# Patient Record
Sex: MALE | Race: BLACK OR AFRICAN AMERICAN | Employment: UNEMPLOYED | URBAN - METROPOLITAN AREA
[De-identification: names, ages, dates, MRNs, and addresses within clinical notes are randomized per-mention and may not be internally consistent; named-entity substitution may affect disease eponyms.]

---

## 2017-06-03 ENCOUNTER — HOSPITAL ENCOUNTER (EMERGENCY)
Age: 57
Discharge: HOME | End: 2017-06-03
Attending: EMERGENCY MEDICINE
Payer: MEDICARE

## 2017-06-03 VITALS
HEART RATE: 84 BPM | WEIGHT: 198 LBS | BODY MASS INDEX: 26.82 KG/M2 | RESPIRATION RATE: 18 BRPM | TEMPERATURE: 97.8 F | SYSTOLIC BLOOD PRESSURE: 143 MMHG | OXYGEN SATURATION: 97 % | HEIGHT: 72 IN | DIASTOLIC BLOOD PRESSURE: 98 MMHG

## 2017-06-03 DIAGNOSIS — R03.0 ELEVATED BLOOD PRESSURE READING: ICD-10-CM

## 2017-06-03 DIAGNOSIS — Z87.39 HX OF DEGENERATIVE DISC DISEASE: ICD-10-CM

## 2017-06-03 DIAGNOSIS — M54.6 ACUTE MIDLINE THORACIC BACK PAIN: Primary | ICD-10-CM

## 2017-06-03 PROCEDURE — A9270 NON-COVERED ITEM OR SERVICE: HCPCS | Performed by: EMERGENCY MEDICINE

## 2017-06-03 PROCEDURE — 74011636637 HC RX REV CODE- 636/637: Performed by: EMERGENCY MEDICINE

## 2017-06-03 PROCEDURE — 99283 EMERGENCY DEPT VISIT LOW MDM: CPT

## 2017-06-03 RX ORDER — PREDNISONE 20 MG/1
60 TABLET ORAL
Status: COMPLETED | OUTPATIENT
Start: 2017-06-03 | End: 2017-06-03

## 2017-06-03 RX ORDER — PREDNISONE 20 MG/1
60 TABLET ORAL DAILY
Qty: 15 TAB | Refills: 0 | Status: SHIPPED | OUTPATIENT
Start: 2017-06-03 | End: 2017-06-08

## 2017-06-03 RX ORDER — INSULIN LISPRO 100 [IU]/ML
60 INJECTION, SOLUTION INTRAVENOUS; SUBCUTANEOUS
Status: ON HOLD | COMMUNITY
End: 2017-09-27

## 2017-06-03 RX ADMIN — PREDNISONE 60 MG: 20 TABLET ORAL at 03:37

## 2017-06-03 NOTE — DISCHARGE INSTRUCTIONS
Back Pain: Care Instructions  Your Care Instructions    Back pain has many possible causes. It is often related to problems with muscles and ligaments of the back. It may also be related to problems with the nerves, discs, or bones of the back. Moving, lifting, standing, sitting, or sleeping in an awkward way can strain the back. Sometimes you don't notice the injury until later. Arthritis is another common cause of back pain. Although it may hurt a lot, back pain usually improves on its own within several weeks. Most people recover in 12 weeks or less. Using good home treatment and being careful not to stress your back can help you feel better sooner. Follow-up care is a key part of your treatment and safety. Be sure to make and go to all appointments, and call your doctor if you are having problems. Its also a good idea to know your test results and keep a list of the medicines you take. How can you care for yourself at home? · Sit or lie in positions that are most comfortable and reduce your pain. Try one of these positions when you lie down:  ¨ Lie on your back with your knees bent and supported by large pillows. ¨ Lie on the floor with your legs on the seat of a sofa or chair. Linda Rolling on your side with your knees and hips bent and a pillow between your legs. ¨ Lie on your stomach if it does not make pain worse. · Do not sit up in bed, and avoid soft couches and twisted positions. Bed rest can help relieve pain at first, but it delays healing. Avoid bed rest after the first day of back pain. · Change positions every 30 minutes. If you must sit for long periods of time, take breaks from sitting. Get up and walk around, or lie in a comfortable position. · Try using a heating pad on a low or medium setting for 15 to 20 minutes every 2 or 3 hours. Try a warm shower in place of one session with the heating pad. · You can also try an ice pack for 10 to 15 minutes every 2 to 3 hours.  Put a thin cloth between the ice pack and your skin. · Take pain medicines exactly as directed. ¨ If the doctor gave you a prescription medicine for pain, take it as prescribed. ¨ If you are not taking a prescription pain medicine, ask your doctor if you can take an over-the-counter medicine. · Take short walks several times a day. You can start with 5 to 10 minutes, 3 or 4 times a day, and work up to longer walks. Walk on level surfaces and avoid hills and stairs until your back is better. · Return to work and other activities as soon as you can. Continued rest without activity is usually not good for your back. · To prevent future back pain, do exercises to stretch and strengthen your back and stomach. Learn how to use good posture, safe lifting techniques, and proper body mechanics. When should you call for help? Call your doctor now or seek immediate medical care if:  · You have new or worsening numbness in your legs. · You have new or worsening weakness in your legs. (This could make it hard to stand up.)  · You lose control of your bladder or bowels. Watch closely for changes in your health, and be sure to contact your doctor if:  · Your pain gets worse. · You are not getting better after 2 weeks. Where can you learn more? Go to http://kailyn-law.info/. Enter R561 in the search box to learn more about \"Back Pain: Care Instructions. \"  Current as of: May 23, 2016  Content Version: 11.2  © 6508-4806 [employer]. Care instructions adapted under license by [employer] (which disclaims liability or warranty for this information). If you have questions about a medical condition or this instruction, always ask your healthcare professional. [address] any warranty or liability for your use of this information.       Musculoskeletal Pain: Care Instructions  Your Care Instructions  Different problems with the bones, muscles, nerves, ligaments, and tendons in the body can cause pain. One or more areas of your body may ache or burn. Or they may feel tired, stiff, or sore. The medical term for this type of pain is musculoskeletal pain. It can have many different causes. Sometimes the pain is caused by an injury such as a strain or sprain. Or you might have pain from using one part of your body in the same way over and over again. This is called overuse. In some cases, the cause of the pain is another health problem such as arthritis or fibromyalgia. The doctor will examine you and ask you questions about your health to help find the cause of your pain. Blood tests or imaging tests like an X-ray may also be helpful. But sometimes doctors can't find a cause of the pain. Treatment depends on your symptoms and the cause of the pain, if known. The doctor has checked you carefully, but problems can develop later. If you notice any problems or new symptoms, get medical treatment right away. Follow-up care is a key part of your treatment and safety. Be sure to make and go to all appointments, and call your doctor if you are having problems. It's also a good idea to know your test results and keep a list of the medicines you take. How can you care for yourself at home? · Rest until you feel better. · Do not do anything that makes the pain worse. Return to exercise gradually if you feel better and your doctor says it's okay. · Be safe with medicines. Read and follow all instructions on the label. ¨ If the doctor gave you a prescription medicine for pain, take it as prescribed. ¨ If you are not taking a prescription pain medicine, ask your doctor if you can take an over-the-counter medicine. · Put ice or a cold pack on the area for 10 to 20 minutes at a time to ease pain. Put a thin cloth between the ice and your skin. When should you call for help? Call your doctor now or seek immediate medical care if:  · You have new pain, or your pain gets worse.   · You have new symptoms such as a fever, a rash, or chills. Watch closely for changes in your health, and be sure to contact your doctor if:  · You do not get better as expected. Where can you learn more? Go to Unbound[employer]  Enter Q624 in the search box to learn more about \"Musculoskeletal Pain: Care Instructions. \"   © 1943-8100 [employer]. Care instructions adapted under license by [employer] (which disclaims liability or warranty for this information). This care instruction is for use with your licensed healthcare professional. If you have questions about a medical condition or this instruction, always ask your healthcare professional. [address] any warranty or liability for your use of this information.   Content Version: 93.8.584718; Current as of: November 20, 2015

## 2017-06-03 NOTE — ED PROVIDER NOTES
HPI Comments: 3:17 AM Toby Dahl is a 59 y.o. male with a hx of Gout, herniated disc, chronic lower back pain, DM, Hepatitis C, DJD, Osteoarthritis, Lupus, and other noted PMH who presents to the ED via EMS c/o mid-upper back pain X 3 days. Patient has positional exacerbations as well as trouble ambulating. He reports using a cane. Pt does mention that he has experienced this pain in the past and he was diagnosed with a herniated disc. He has tried heat compression, but that has provided minimal relief. He has not tried any pain medication or muscle relaxer's. He also c/o bilateral leg weakness secondary to the pain while ambulating. He denies any recent injuries or falls, SOB, chest pain, difficulty urinating, and BM complications. No other associated symptoms or modifying factors at this time. The history is provided by the patient. Past Medical History:   Diagnosis Date    BPH (benign prostatic hypertrophy) with urinary retention     Chronic low back pain     Diabetes (HCC)     DJD (degenerative joint disease)     ED (erectile dysfunction)     Elevated PSA     Glucose intolerance (impaired glucose tolerance)     Gout     Hepatitis C     Herniated disc     History of total knee replacement 3/2006    Hyperlipidemia     Incomplete emptying of bladder     Lupus ([facility] [address]     Osteoarthritis     Sleep apnea        Past Surgical History:   Procedure Laterality Date    HX HEART CATHETERIZATION  2006    HX KNEE ARTHROSCOPY      HX KNEE REPLACEMENT  3/2006    HX UROLOGICAL  11/06/2012    [facility],          History reviewed. No pertinent family history. Social History     Social History    Marital status:      Spouse name: N/A    Number of children: N/A    Years of education: N/A     Occupational History    Not on file.      Social History Main Topics    Smoking status: Current Every Day Smoker     Packs/day: 0.50    Smokeless tobacco: Never Used    Alcohol use No  Drug use: No    Sexual activity: Not on file     Other Topics Concern    Not on file     Social History Narrative         ALLERGIES: Hay fever and allergy relief; Penicillins; and Vioxx [rofecoxib]    Review of Systems   Constitutional: Positive for fever. HENT: Negative for trouble swallowing. Respiratory: Negative for shortness of breath. Cardiovascular: Negative for chest pain. Gastrointestinal: Negative for abdominal pain, diarrhea and vomiting. Genitourinary: Negative for difficulty urinating. Musculoskeletal: Positive for back pain and gait problem. Negative for neck pain. Skin: Negative for wound. Neurological: Positive for weakness (bilateral leg). Negative for syncope. Psychiatric/Behavioral: Negative for behavioral problems. All other systems reviewed and are negative. Vitals:    06/03/17 0101   BP: (!) 145/108   Pulse: 88   Resp: 18   Temp: 97.8 °F (36.6 °C)   SpO2: 98%   Weight: 89.8 kg (198 lb)   Height: 6' (1.829 m)            Physical Exam   Constitutional: He is oriented to person, place, and time. He appears well-developed. No distress. Chronically ill-appearing, nad   HENT:   Head: Normocephalic and atraumatic. Eyes: EOM are normal.   Neck: Normal range of motion. Cardiovascular: Normal rate. Pulmonary/Chest: Effort normal and breath sounds normal. No respiratory distress. Abdominal: Soft. There is no tenderness. Musculoskeletal: Normal range of motion. Mechanically stable, ambulates without much difficulty  No spinal tenderness  Bilateral knee surgical scars   Neurological: He is alert and oriented to person, place, and time. No focal deficits noted   Psychiatric: His behavior is normal.   Nursing note and vitals reviewed.        MDM  Number of Diagnoses or Management Options  Acute midline thoracic back pain:   Elevated blood pressure reading:   Hx of degenerative disc disease:   Diagnosis management comments: 58 yo AAM with PMHx chronic pain, DJD presents with several days mid back pain. No trauma, no fevers. No other symptoms, problems, or complaints. Examination unremarkable with no spinal tenderness and ambulates without difficulty. Pt sleeping comfortably in ED. Suspect likely related to chronic DJD. Dc home, symptom management, follow-up, return precautions. Pt states he does not tolerate nsaids, will give trial of prednisone. ED Course       Procedures    Vitals:  Patient Vitals for the past 12 hrs:   Temp Pulse Resp BP SpO2   06/03/17 0101 97.8 °F (36.6 °C) 88 18 (!) 145/108 98 %         Medications ordered:   Medications   predniSONE (DELTASONE) tablet 60 mg (not administered)         Lab findings:  No results found for this or any previous visit (from the past 12 hour(s)). Progress notes, Consult notes or additional Procedure notes:   I informed the patient about their BP and instructed them to follow up with their PCP regarding their elevated blood pressure. 3:25 AM I have reassessed the patient and discussed their results and diagnosis. Pt will be discharged in stable condition. Patient is to return to emergency department if any new or worsening condition. Patient understands and verbalizes agreement with plan. Disposition:  Diagnosis:   1. Acute midline thoracic back pain    2. Hx of degenerative disc disease    3. Elevated blood pressure reading        Disposition: Discharged    Follow-up Information     Follow up With Details Comments Contact Info    Andrez Amor MD Schedule an appointment as soon as possible for a visit Follow up [address] [place] [id number] [phone]      [facility] EMERGENCY DEPT Go to As needed, If symptoms worsen [id number]  [phone]           Patient's Medications   Start Taking    PREDNISONE (DELTASONE) 20 MG TABLET    Take 3 Tabs by mouth daily for 5 days.  With Breakfast   Continue Taking    ERGOCALCIFEROL (VITAMIN D2) 50,000 UNIT CAPSULE Take 50,000 Units by mouth. INSULIN DETEMIR (LEVEMIR) 100 UNIT/ML INJECTION    0.45 mL by SubCUTAneous route nightly. INSULIN LISPRO (HUMALOG) 100 UNIT/ML INJECTION    60 Units by SubCUTAneous route Before breakfast, lunch, and dinner. METFORMIN (GLUCOPHAGE) 500 MG TABLET    Take 1,000 mg by mouth two (2) times daily (with meals). SILDENAFIL CITRATE (VIAGRA) 100 MG TABLET    Take 1 tablet by mouth daily as needed for up to 10 doses. These Medications have changed    No medications on file   Stop Taking    No medications on file       SCRIBE ATTESTATION STATEMENT  Documented by: Clint May for, and in the presence of, Leah Castillo MD 3:22 AM    Signed by: Agustin Vazquez, 06/03/17 3:22 AM    PROVIDER ATTESTATION STATEMENT  I personally performed the services described in the documentation, reviewed the documentation, as recorded by the scribe in my presence, and it accurately and completely records my words and actions.   Leah Castillo MD

## 2017-09-03 ENCOUNTER — APPOINTMENT (OUTPATIENT)
Dept: ULTRASOUND IMAGING | Age: 57
End: 2017-09-03
Attending: EMERGENCY MEDICINE
Payer: MEDICARE

## 2017-09-03 ENCOUNTER — HOSPITAL ENCOUNTER (EMERGENCY)
Age: 57
Discharge: LEFT BEFORE DISCHARGE | End: 2017-09-03
Attending: EMERGENCY MEDICINE
Payer: MEDICARE

## 2017-09-03 VITALS
HEART RATE: 78 BPM | OXYGEN SATURATION: 100 % | SYSTOLIC BLOOD PRESSURE: 129 MMHG | RESPIRATION RATE: 14 BRPM | DIASTOLIC BLOOD PRESSURE: 77 MMHG

## 2017-09-03 DIAGNOSIS — R10.11 ABDOMINAL PAIN, RIGHT UPPER QUADRANT: Primary | ICD-10-CM

## 2017-09-03 LAB
ALBUMIN SERPL-MCNC: 3 G/DL (ref 3.4–5)
ALBUMIN/GLOB SERPL: 0.7 {RATIO} (ref 0.8–1.7)
ALP SERPL-CCNC: 179 U/L (ref 45–117)
ALT SERPL-CCNC: 19 U/L (ref 16–61)
ANION GAP SERPL CALC-SCNC: 8 MMOL/L (ref 3–18)
APPEARANCE UR: CLEAR
AST SERPL-CCNC: 13 U/L (ref 15–37)
BACTERIA URNS QL MICRO: ABNORMAL /HPF
BASOPHILS # BLD: 0 K/UL (ref 0–0.06)
BASOPHILS NFR BLD: 0 % (ref 0–2)
BILIRUB DIRECT SERPL-MCNC: 0.5 MG/DL (ref 0–0.2)
BILIRUB SERPL-MCNC: 1 MG/DL (ref 0.2–1)
BILIRUB UR QL: ABNORMAL
BUN SERPL-MCNC: 9 MG/DL (ref 7–18)
BUN/CREAT SERPL: 9 (ref 12–20)
CALCIUM SERPL-MCNC: 9.4 MG/DL (ref 8.5–10.1)
CHLORIDE SERPL-SCNC: 102 MMOL/L (ref 100–108)
CK MB CFR SERPL CALC: ABNORMAL % (ref 0–4)
CK MB SERPL-MCNC: <1 NG/ML (ref 5–25)
CK SERPL-CCNC: 32 U/L (ref 39–308)
CO2 SERPL-SCNC: 28 MMOL/L (ref 21–32)
COLOR UR: YELLOW
CREAT SERPL-MCNC: 1.01 MG/DL (ref 0.6–1.3)
DIFFERENTIAL METHOD BLD: ABNORMAL
EOSINOPHIL # BLD: 0.1 K/UL (ref 0–0.4)
EOSINOPHIL NFR BLD: 1 % (ref 0–5)
EPITH CASTS URNS QL MICRO: ABNORMAL /LPF (ref 0–5)
ERYTHROCYTE [DISTWIDTH] IN BLOOD BY AUTOMATED COUNT: 15.7 % (ref 11.6–14.5)
GLOBULIN SER CALC-MCNC: 4.1 G/DL (ref 2–4)
GLUCOSE SERPL-MCNC: 137 MG/DL (ref 74–99)
GLUCOSE UR STRIP.AUTO-MCNC: NEGATIVE MG/DL
HCT VFR BLD AUTO: 43.8 % (ref 36–48)
HGB BLD-MCNC: 14.9 G/DL (ref 13–16)
HGB UR QL STRIP: ABNORMAL
HYALINE CASTS URNS QL MICRO: ABNORMAL /LPF (ref 0–2)
KETONES UR QL STRIP.AUTO: ABNORMAL MG/DL
LEUKOCYTE ESTERASE UR QL STRIP.AUTO: ABNORMAL
LIPASE SERPL-CCNC: 140 U/L (ref 73–393)
LYMPHOCYTES # BLD: 1.2 K/UL (ref 0.9–3.6)
LYMPHOCYTES NFR BLD: 13 % (ref 21–52)
MCH RBC QN AUTO: 29.4 PG (ref 24–34)
MCHC RBC AUTO-ENTMCNC: 34 G/DL (ref 31–37)
MCV RBC AUTO: 86.6 FL (ref 74–97)
MONOCYTES # BLD: 0.9 K/UL (ref 0.05–1.2)
MONOCYTES NFR BLD: 10 % (ref 3–10)
NEUTS SEG # BLD: 7.2 K/UL (ref 1.8–8)
NEUTS SEG NFR BLD: 76 % (ref 40–73)
NITRITE UR QL STRIP.AUTO: NEGATIVE
PH UR STRIP: 6 [PH] (ref 5–8)
PLATELET # BLD AUTO: 190 K/UL (ref 135–420)
PMV BLD AUTO: 9.8 FL (ref 9.2–11.8)
POTASSIUM SERPL-SCNC: 3.9 MMOL/L (ref 3.5–5.5)
PROT SERPL-MCNC: 7.1 G/DL (ref 6.4–8.2)
PROT UR STRIP-MCNC: 30 MG/DL
RBC # BLD AUTO: 5.06 M/UL (ref 4.7–5.5)
RBC #/AREA URNS HPF: ABNORMAL /HPF (ref 0–5)
SODIUM SERPL-SCNC: 138 MMOL/L (ref 136–145)
SP GR UR REFRACTOMETRY: 1.02 (ref 1–1.03)
TROPONIN I SERPL-MCNC: <0.02 NG/ML (ref 0–0.04)
UROBILINOGEN UR QL STRIP.AUTO: 2 EU/DL (ref 0.2–1)
WBC # BLD AUTO: 9.5 K/UL (ref 4.6–13.2)
WBC URNS QL MICRO: ABNORMAL /HPF (ref 0–4)

## 2017-09-03 PROCEDURE — 96361 HYDRATE IV INFUSION ADD-ON: CPT

## 2017-09-03 PROCEDURE — 85025 COMPLETE CBC W/AUTO DIFF WBC: CPT | Performed by: EMERGENCY MEDICINE

## 2017-09-03 PROCEDURE — 96376 TX/PRO/DX INJ SAME DRUG ADON: CPT

## 2017-09-03 PROCEDURE — 83690 ASSAY OF LIPASE: CPT | Performed by: EMERGENCY MEDICINE

## 2017-09-03 PROCEDURE — 80076 HEPATIC FUNCTION PANEL: CPT | Performed by: EMERGENCY MEDICINE

## 2017-09-03 PROCEDURE — 80048 BASIC METABOLIC PNL TOTAL CA: CPT | Performed by: EMERGENCY MEDICINE

## 2017-09-03 PROCEDURE — 96374 THER/PROPH/DIAG INJ IV PUSH: CPT

## 2017-09-03 PROCEDURE — 81001 URINALYSIS AUTO W/SCOPE: CPT | Performed by: EMERGENCY MEDICINE

## 2017-09-03 PROCEDURE — 82550 ASSAY OF CK (CPK): CPT | Performed by: EMERGENCY MEDICINE

## 2017-09-03 PROCEDURE — 96375 TX/PRO/DX INJ NEW DRUG ADDON: CPT

## 2017-09-03 PROCEDURE — 74011250636 HC RX REV CODE- 250/636: Performed by: EMERGENCY MEDICINE

## 2017-09-03 PROCEDURE — 76705 ECHO EXAM OF ABDOMEN: CPT

## 2017-09-03 PROCEDURE — 99284 EMERGENCY DEPT VISIT MOD MDM: CPT

## 2017-09-03 RX ORDER — SODIUM CHLORIDE 0.9 % (FLUSH) 0.9 %
5-10 SYRINGE (ML) INJECTION EVERY 8 HOURS
Status: DISCONTINUED | OUTPATIENT
Start: 2017-09-03 | End: 2017-09-03 | Stop reason: HOSPADM

## 2017-09-03 RX ORDER — SODIUM CHLORIDE 0.9 % (FLUSH) 0.9 %
5-10 SYRINGE (ML) INJECTION AS NEEDED
Status: DISCONTINUED | OUTPATIENT
Start: 2017-09-03 | End: 2017-09-03 | Stop reason: HOSPADM

## 2017-09-03 RX ORDER — MORPHINE SULFATE 2 MG/ML
4 INJECTION, SOLUTION INTRAMUSCULAR; INTRAVENOUS ONCE
Status: COMPLETED | OUTPATIENT
Start: 2017-09-03 | End: 2017-09-03

## 2017-09-03 RX ORDER — ONDANSETRON 2 MG/ML
4 INJECTION INTRAMUSCULAR; INTRAVENOUS
Status: COMPLETED | OUTPATIENT
Start: 2017-09-03 | End: 2017-09-03

## 2017-09-03 RX ADMIN — MORPHINE SULFATE 2 MG: 2 INJECTION, SOLUTION INTRAMUSCULAR; INTRAVENOUS at 14:21

## 2017-09-03 RX ADMIN — MORPHINE SULFATE 4 MG: 2 INJECTION, SOLUTION INTRAMUSCULAR; INTRAVENOUS at 17:13

## 2017-09-03 RX ADMIN — ONDANSETRON 4 MG: 2 SOLUTION INTRAMUSCULAR; INTRAVENOUS at 16:58

## 2017-09-03 RX ADMIN — SODIUM CHLORIDE 1000 ML: 900 INJECTION, SOLUTION INTRAVENOUS at 14:16

## 2017-09-03 NOTE — ED NOTES
Dr. Shelli Verdin bedside talking with patient about risk of leaving and signing himself out AMA. Patient says he will see his cancer doctor on Monday and let him decide what he needs for abd pain.

## 2017-09-03 NOTE — ED NOTES
Call from ultrasound patient not tolerating ultrasound. Informed Dr. Eric Curry. Ordered and gave Morphine and Zofran. Patient still refusing ultrasound.

## 2017-09-03 NOTE — ED PROVIDER NOTES
HPI Comments: 2:14 PM Tobyjohnny Waller is a 59 y.o. male w/ PMHx of DM, Lupus, HLD, and HepCwho presents to the ED c/o right back and abdominal pain. Pt has had this pain for a week but claims it got worse one day ago. Pt also had a fall one day ago but does not attribute these sx to that incident. Pt has had hemoptysis, hematuria, and diahrrea for the last week but denies emesis. Pt is allergic to penicilin. Pt has no other sx or complaints. Patient is a 59 y.o. male presenting with abdominal pain and fall. Abdominal Pain    Associated symptoms include diarrhea, hematuria and back pain. Pertinent negatives include no fever, no dysuria, no arthralgias, no myalgias and no chest pain. Fall   Associated symptoms include hematuria. Pertinent negatives include no fever and no abdominal pain. Past Medical History:   Diagnosis Date    BPH (benign prostatic hypertrophy) with urinary retention     Chronic low back pain     Diabetes (HCC)     DJD (degenerative joint disease)     ED (erectile dysfunction)     Elevated PSA     Glucose intolerance (impaired glucose tolerance)     Gout     Hepatitis C     Herniated disc     History of total knee replacement 3/2006    Hyperlipidemia     Incomplete emptying of bladder     Lupus ([facility] [address]     Osteoarthritis     Sleep apnea        Past Surgical History:   Procedure Laterality Date    HX HEART CATHETERIZATION  2006    HX KNEE ARTHROSCOPY      HX KNEE REPLACEMENT  3/2006    HX UROLOGICAL  11/06/2012    [facility],          History reviewed. No pertinent family history. Social History     Social History    Marital status:      Spouse name: N/A    Number of children: N/A    Years of education: N/A     Occupational History    Not on file.      Social History Main Topics    Smoking status: Current Every Day Smoker     Packs/day: 0.50    Smokeless tobacco: Never Used    Alcohol use No    Drug use: No    Sexual activity: Not on file Other Topics Concern    Not on file     Social History Narrative         ALLERGIES: Hay fever and allergy relief; Penicillins; and Vioxx [rofecoxib]    Review of Systems   Constitutional: Negative for diaphoresis and fever. HENT: Negative for congestion and sore throat. Eyes: Negative for pain and itching. Respiratory: Positive for cough (hemoptysis). Negative for shortness of breath. Cardiovascular: Negative for chest pain and palpitations. Gastrointestinal: Positive for diarrhea. Negative for abdominal pain. Endocrine: Negative for polydipsia and polyuria. Genitourinary: Positive for hematuria. Negative for dysuria. Musculoskeletal: Positive for back pain. Negative for arthralgias and myalgias. Right abdominal pain   Skin: Negative for rash and wound. Neurological: Negative for seizures and syncope. Hematological: Does not bruise/bleed easily. Psychiatric/Behavioral: Negative for agitation and hallucinations. Vitals:    09/03/17 1540 09/03/17 1550 09/03/17 1600 09/03/17 1841   BP: (!) 156/108 (!) 126/110 121/83 129/77   Pulse:    78   Resp:    14   SpO2:   100% 100%            Physical Exam   Constitutional: He appears well-developed and well-nourished. He appears distressed. HENT:   Head: Normocephalic and atraumatic. Eyes: Conjunctivae are normal. No scleral icterus. Neck: Normal range of motion. Neck supple. No JVD present. Cardiovascular: Normal rate, regular rhythm and normal heart sounds. 4 intact extremity pulses   Pulmonary/Chest: Effort normal and breath sounds normal.   Abdominal: Soft. He exhibits no mass. There is no tenderness. Musculoskeletal: Normal range of motion. He exhibits tenderness (moderate RUQ tenderness). Lymphadenopathy:     He has no cervical adenopathy. Neurological: He is alert. Skin: Skin is warm and dry. Nursing note and vitals reviewed.        MDM  Number of Diagnoses or Management Options  Diagnosis management comments: Differential: pancreatitis, acute cholecystitis; less likely renal colic, appendicitis. ED Course       Procedures  Vitals:  No data found. Medications Ordered:  Medications   sodium chloride (NS) flush 5-10 mL (not administered)   sodium chloride (NS) flush 5-10 mL (not administered)   morphine injection 4 mg (2 mg IntraVENous Given 9/3/17 1421)   sodium chloride 0.9 % bolus infusion 1,000 mL (0 mL IntraVENous IV Completed 9/3/17 1500)   morphine injection 4 mg (4 mg IntraVENous Given 9/3/17 1713)   ondansetron (ZOFRAN) injection 4 mg (4 mg IntraVENous Given 9/3/17 1658)       Lab Findings:  Recent Results (from the past 12 hour(s))   CBC WITH AUTOMATED DIFF    Collection Time: 09/03/17  1:20 PM   Result Value Ref Range    WBC 9.5 4.6 - 13.2 K/uL    RBC 5.06 4.70 - 5.50 M/uL    HGB 14.9 13.0 - 16.0 g/dL    HCT 43.8 36.0 - 48.0 %    MCV 86.6 74.0 - 97.0 FL    MCH 29.4 24.0 - 34.0 PG    MCHC 34.0 31.0 - 37.0 g/dL    RDW 15.7 (H) 11.6 - 14.5 %    PLATELET 658 812 - 712 K/uL    MPV 9.8 9.2 - 11.8 FL    NEUTROPHILS 76 (H) 40 - 73 %    LYMPHOCYTES 13 (L) 21 - 52 %    MONOCYTES 10 3 - 10 %    EOSINOPHILS 1 0 - 5 %    BASOPHILS 0 0 - 2 %    ABS. NEUTROPHILS 7.2 1.8 - 8.0 K/UL    ABS. LYMPHOCYTES 1.2 0.9 - 3.6 K/UL    ABS. MONOCYTES 0.9 0.05 - 1.2 K/UL    ABS. EOSINOPHILS 0.1 0.0 - 0.4 K/UL    ABS.  BASOPHILS 0.0 0.0 - 0.06 K/UL    DF AUTOMATED     METABOLIC PANEL, BASIC    Collection Time: 09/03/17  1:20 PM   Result Value Ref Range    Sodium 138 136 - 145 mmol/L    Potassium 3.9 3.5 - 5.5 mmol/L    Chloride 102 100 - 108 mmol/L    CO2 28 21 - 32 mmol/L    Anion gap 8 3.0 - 18 mmol/L    Glucose 137 (H) 74 - 99 mg/dL    BUN 9 7.0 - 18 MG/DL    Creatinine 1.01 0.6 - 1.3 MG/DL    BUN/Creatinine ratio 9 (L) 12 - 20      GFR est AA >60 >60 ml/min/1.73m2    GFR est non-AA >60 >60 ml/min/1.73m2    Calcium 9.4 8.5 - 10.1 MG/DL   CARDIAC PANEL,(CK, CKMB & TROPONIN)    Collection Time: 09/03/17  1:20 PM   Result Value Ref Range    CK 32 (L) 39 - 308 U/L    CK - MB <1.0 <3.6 ng/ml    CK-MB Index  0.0 - 4.0 %     CALCULATION NOT PERFORMED WHEN RESULT IS BELOW LINEAR LIMIT    Troponin-I, Qt. <0.02 0.0 - 0.045 NG/ML   HEPATIC FUNCTION PANEL    Collection Time: 09/03/17  1:20 PM   Result Value Ref Range    Protein, total 7.1 6.4 - 8.2 g/dL    Albumin 3.0 (L) 3.4 - 5.0 g/dL    Globulin 4.1 (H) 2.0 - 4.0 g/dL    A-G Ratio 0.7 (L) 0.8 - 1.7      Bilirubin, total 1.0 0.2 - 1.0 MG/DL    Bilirubin, direct 0.5 (H) 0.0 - 0.2 MG/DL    Alk. phosphatase 179 (H) 45 - 117 U/L    AST (SGOT) 13 (L) 15 - 37 U/L    ALT (SGPT) 19 16 - 61 U/L   LIPASE    Collection Time: 09/03/17  1:20 PM   Result Value Ref Range    Lipase 140 73 - 393 U/L   URINALYSIS W/ RFLX MICROSCOPIC    Collection Time: 09/03/17  2:20 PM   Result Value Ref Range    Color YELLOW      Appearance CLEAR      Specific gravity 1.020 1.005 - 1.030      pH (UA) 6.0 5.0 - 8.0      Protein 30 (A) NEG mg/dL    Glucose NEGATIVE  NEG mg/dL    Ketone TRACE (A) NEG mg/dL    Bilirubin SMALL (A) NEG      Blood TRACE (A) NEG      Urobilinogen 2.0 (H) 0.2 - 1.0 EU/dL    Nitrites NEGATIVE  NEG      Leukocyte Esterase SMALL (A) NEG     URINE MICROSCOPIC ONLY    Collection Time: 09/03/17  2:20 PM   Result Value Ref Range    WBC 4 to 10 0 - 4 /hpf    RBC 1 to 3 0 - 5 /hpf    Epithelial cells FEW 0 - 5 /lpf    Bacteria FEW (A) NEG /hpf    Hyaline cast 0 to 2 0 - 2 /lpf         X-ray, CT or radiology findings or impressions:  [facility]    (Results Pending)       Progress notes, consult notes, or additional procedure notes:  During the ultrasound, patient stated his pain was too severe and didn't want to continue, he came back to the ER where he received pain and nausea meds, he explain he has appointment Tuesday with onc where he will get mri of his liver, and he just want sto wait for this. He appears coherent and able to understands risks of leaving with the imaging I've recommended.   I also discussed changing to CT abd which would not place pressure on his abd. He declines. Risks include undiagnosed surgical emergency in his abd, infected galbladder, other undiagnosed disease, possibly leading to death. Diagnosis:   1. Abdominal pain, right upper quadrant        Disposition: AMA    Follow-up Information     None           Patient's Medications   Start Taking    No medications on file   Continue Taking    ERGOCALCIFEROL (VITAMIN D2) 50,000 UNIT CAPSULE    Take 50,000 Units by mouth. INSULIN DETEMIR (LEVEMIR) 100 UNIT/ML INJECTION    0.45 mL by SubCUTAneous route nightly. INSULIN LISPRO (HUMALOG) 100 UNIT/ML INJECTION    60 Units by SubCUTAneous route Before breakfast, lunch, and dinner. METFORMIN (GLUCOPHAGE) 500 MG TABLET    Take 1,000 mg by mouth two (2) times daily (with meals). These Medications have changed    No medications on file   Stop Taking    SILDENAFIL CITRATE (VIAGRA) 100 MG TABLET    Take 1 tablet by mouth daily as needed for up to 10 doses. [address] acting as a scribe for and in the presence of Sarah Branham MD      September 03, 2017 at 2:14 PM       Provider Attestation:      I personally performed the services described in the documentation, reviewed the documentation, as recorded by the scribe in my presence, and it accurately and completely records my words and actions.  September 03, 2017 at 2:14 PM - Sarah Branham MD

## 2017-09-03 NOTE — ED NOTES
Discharged patient via wheelchair AMA after explaining risk of leaving without treatment per Dr. Asia Gu.

## 2017-09-26 ENCOUNTER — APPOINTMENT (OUTPATIENT)
Dept: CT IMAGING | Age: 57
DRG: 436 | End: 2017-09-26
Attending: EMERGENCY MEDICINE
Payer: MEDICARE

## 2017-09-26 ENCOUNTER — HOSPITAL ENCOUNTER (INPATIENT)
Age: 57
LOS: 2 days | Discharge: HOME | DRG: 436 | End: 2017-09-28
Attending: EMERGENCY MEDICINE | Admitting: INTERNAL MEDICINE
Payer: MEDICARE

## 2017-09-26 ENCOUNTER — HOSPITAL ENCOUNTER (EMERGENCY)
Age: 57
Discharge: HOME | DRG: 436 | End: 2017-09-26
Attending: EMERGENCY MEDICINE
Payer: MEDICARE

## 2017-09-26 VITALS
TEMPERATURE: 99.5 F | OXYGEN SATURATION: 99 % | SYSTOLIC BLOOD PRESSURE: 106 MMHG | DIASTOLIC BLOOD PRESSURE: 81 MMHG | HEART RATE: 120 BPM | RESPIRATION RATE: 27 BRPM

## 2017-09-26 DIAGNOSIS — C78.7 METASTATIC CANCER TO LIVER (HCC): Primary | ICD-10-CM

## 2017-09-26 DIAGNOSIS — C22.0 HEPATIC CARCINOMA (HCC): Primary | ICD-10-CM

## 2017-09-26 PROBLEM — C22.8 LIVER CANCER, PRIMARY, WITH METASTASIS FROM LIVER TO OTHER SITE (HCC): Status: ACTIVE | Noted: 2017-09-26

## 2017-09-26 LAB
ALBUMIN SERPL-MCNC: 2.6 G/DL (ref 3.4–5)
ALBUMIN/GLOB SERPL: 0.5 {RATIO} (ref 0.8–1.7)
ALP SERPL-CCNC: 422 U/L (ref 45–117)
ALT SERPL-CCNC: 49 U/L (ref 16–61)
ANION GAP SERPL CALC-SCNC: 10 MMOL/L (ref 3–18)
AST SERPL-CCNC: 56 U/L (ref 15–37)
ATRIAL RATE: 95 BPM
BASOPHILS # BLD: 0 K/UL (ref 0–0.06)
BASOPHILS NFR BLD: 0 % (ref 0–2)
BILIRUB DIRECT SERPL-MCNC: 0.8 MG/DL (ref 0–0.2)
BILIRUB SERPL-MCNC: 1.3 MG/DL (ref 0.2–1)
BUN SERPL-MCNC: 7 MG/DL (ref 7–18)
BUN/CREAT SERPL: 8 (ref 12–20)
CALCIUM SERPL-MCNC: 9.4 MG/DL (ref 8.5–10.1)
CALCULATED P AXIS, ECG09: 45 DEGREES
CALCULATED R AXIS, ECG10: -41 DEGREES
CALCULATED T AXIS, ECG11: 58 DEGREES
CHLORIDE SERPL-SCNC: 99 MMOL/L (ref 100–108)
CO2 SERPL-SCNC: 27 MMOL/L (ref 21–32)
CREAT SERPL-MCNC: 0.88 MG/DL (ref 0.6–1.3)
DIAGNOSIS, 93000: NORMAL
DIFFERENTIAL METHOD BLD: ABNORMAL
EOSINOPHIL # BLD: 0.2 K/UL (ref 0–0.4)
EOSINOPHIL NFR BLD: 2 % (ref 0–5)
ERYTHROCYTE [DISTWIDTH] IN BLOOD BY AUTOMATED COUNT: 15.5 % (ref 11.6–14.5)
EST. AVERAGE GLUCOSE BLD GHB EST-MCNC: 146 MG/DL
GLOBULIN SER CALC-MCNC: 4.8 G/DL (ref 2–4)
GLUCOSE BLD STRIP.AUTO-MCNC: 60 MG/DL (ref 70–110)
GLUCOSE BLD STRIP.AUTO-MCNC: 87 MG/DL (ref 70–110)
GLUCOSE BLD STRIP.AUTO-MCNC: 91 MG/DL (ref 70–110)
GLUCOSE BLD STRIP.AUTO-MCNC: 93 MG/DL (ref 70–110)
GLUCOSE BLD STRIP.AUTO-MCNC: 93 MG/DL (ref 70–110)
GLUCOSE SERPL-MCNC: 55 MG/DL (ref 74–99)
HBA1C MFR BLD: 6.7 % (ref 4.2–5.6)
HCT VFR BLD AUTO: 46.7 % (ref 36–48)
HGB BLD-MCNC: 16.1 G/DL (ref 13–16)
LIPASE SERPL-CCNC: 75 U/L (ref 73–393)
LYMPHOCYTES # BLD: 0.8 K/UL (ref 0.9–3.6)
LYMPHOCYTES NFR BLD: 6 % (ref 21–52)
MCH RBC QN AUTO: 29.2 PG (ref 24–34)
MCHC RBC AUTO-ENTMCNC: 34.5 G/DL (ref 31–37)
MCV RBC AUTO: 84.6 FL (ref 74–97)
MONOCYTES # BLD: 1.5 K/UL (ref 0.05–1.2)
MONOCYTES NFR BLD: 11 % (ref 3–10)
NEUTS SEG # BLD: 11.4 K/UL (ref 1.8–8)
NEUTS SEG NFR BLD: 81 % (ref 40–73)
P-R INTERVAL, ECG05: 132 MS
PLATELET # BLD AUTO: 182 K/UL (ref 135–420)
PMV BLD AUTO: 10 FL (ref 9.2–11.8)
POTASSIUM SERPL-SCNC: 3.7 MMOL/L (ref 3.5–5.5)
PROT SERPL-MCNC: 7.4 G/DL (ref 6.4–8.2)
Q-T INTERVAL, ECG07: 358 MS
QRS DURATION, ECG06: 88 MS
QTC CALCULATION (BEZET), ECG08: 449 MS
RBC # BLD AUTO: 5.52 M/UL (ref 4.7–5.5)
SODIUM SERPL-SCNC: 136 MMOL/L (ref 136–145)
TROPONIN I SERPL-MCNC: <0.02 NG/ML (ref 0–0.04)
VENTRICULAR RATE, ECG03: 95 BPM
WBC # BLD AUTO: 13.9 K/UL (ref 4.6–13.2)

## 2017-09-26 PROCEDURE — 74011250636 HC RX REV CODE- 250/636: Performed by: INTERNAL MEDICINE

## 2017-09-26 PROCEDURE — 96374 THER/PROPH/DIAG INJ IV PUSH: CPT

## 2017-09-26 PROCEDURE — 99284 EMERGENCY DEPT VISIT MOD MDM: CPT

## 2017-09-26 PROCEDURE — 83036 HEMOGLOBIN GLYCOSYLATED A1C: CPT | Performed by: INTERNAL MEDICINE

## 2017-09-26 PROCEDURE — 74011250636 HC RX REV CODE- 250/636: Performed by: EMERGENCY MEDICINE

## 2017-09-26 PROCEDURE — 65660000000 HC RM CCU STEPDOWN

## 2017-09-26 RX ORDER — HEPARIN SODIUM 5000 [USP'U]/ML
5000 INJECTION, SOLUTION INTRAVENOUS; SUBCUTANEOUS EVERY 8 HOURS
Status: DISCONTINUED | OUTPATIENT
Start: 2017-09-26 | End: 2017-09-28 | Stop reason: HOSPADM

## 2017-09-26 RX ORDER — DEXTROSE 50 % IN WATER (D50W) INTRAVENOUS SYRINGE
Status: COMPLETED
Start: 2017-09-26 | End: 2017-09-26

## 2017-09-26 RX ORDER — INSULIN LISPRO 100 [IU]/ML
INJECTION, SOLUTION INTRAVENOUS; SUBCUTANEOUS
Status: DISCONTINUED | OUTPATIENT
Start: 2017-09-26 | End: 2017-09-28 | Stop reason: HOSPADM

## 2017-09-26 RX ORDER — DEXTROSE 50 % IN WATER (D50W) INTRAVENOUS SYRINGE
25
Status: COMPLETED | OUTPATIENT
Start: 2017-09-26 | End: 2017-09-26

## 2017-09-26 RX ORDER — MORPHINE SULFATE 10 MG/ML
5 INJECTION, SOLUTION INTRAMUSCULAR; INTRAVENOUS
Status: DISCONTINUED | OUTPATIENT
Start: 2017-09-26 | End: 2017-09-28 | Stop reason: HOSPADM

## 2017-09-26 RX ORDER — MORPHINE SULFATE 2 MG/ML
4 INJECTION, SOLUTION INTRAMUSCULAR; INTRAVENOUS ONCE
Status: COMPLETED | OUTPATIENT
Start: 2017-09-26 | End: 2017-09-26

## 2017-09-26 RX ORDER — ZOLPIDEM TARTRATE 5 MG/1
5 TABLET ORAL
Status: DISCONTINUED | OUTPATIENT
Start: 2017-09-26 | End: 2017-09-28 | Stop reason: HOSPADM

## 2017-09-26 RX ORDER — DEXTROSE 50 % IN WATER (D50W) INTRAVENOUS SYRINGE
25-50 AS NEEDED
Status: DISCONTINUED | OUTPATIENT
Start: 2017-09-26 | End: 2017-09-28 | Stop reason: HOSPADM

## 2017-09-26 RX ORDER — SODIUM CHLORIDE 9 MG/ML
100 INJECTION, SOLUTION INTRAVENOUS CONTINUOUS
Status: DISCONTINUED | OUTPATIENT
Start: 2017-09-26 | End: 2017-09-27

## 2017-09-26 RX ORDER — ONDANSETRON 4 MG/1
4 TABLET, ORALLY DISINTEGRATING ORAL
Status: DISCONTINUED | OUTPATIENT
Start: 2017-09-26 | End: 2017-09-28 | Stop reason: HOSPADM

## 2017-09-26 RX ORDER — MAGNESIUM SULFATE 100 %
4 CRYSTALS MISCELLANEOUS AS NEEDED
Status: DISCONTINUED | OUTPATIENT
Start: 2017-09-26 | End: 2017-09-28 | Stop reason: HOSPADM

## 2017-09-26 RX ORDER — LORAZEPAM 2 MG/ML
0.5 INJECTION INTRAMUSCULAR
Status: COMPLETED | OUTPATIENT
Start: 2017-09-26 | End: 2017-09-26

## 2017-09-26 RX ADMIN — DEXTROSE 50 % IN WATER (D50W) INTRAVENOUS SYRINGE 12.5 G: at 13:24

## 2017-09-26 RX ADMIN — DEXTROSE MONOHYDRATE 12.5 G: 25 INJECTION, SOLUTION INTRAVENOUS at 13:24

## 2017-09-26 RX ADMIN — MORPHINE SULFATE 4 MG: 2 INJECTION, SOLUTION INTRAMUSCULAR; INTRAVENOUS at 16:56

## 2017-09-26 RX ADMIN — HEPARIN SODIUM 5000 UNITS: 5000 INJECTION, SOLUTION INTRAVENOUS; SUBCUTANEOUS at 20:56

## 2017-09-26 RX ADMIN — LORAZEPAM 0.5 MG: 2 INJECTION INTRAMUSCULAR; INTRAVENOUS at 12:22

## 2017-09-26 RX ADMIN — SODIUM CHLORIDE 1000 ML: 900 INJECTION, SOLUTION INTRAVENOUS at 12:21

## 2017-09-26 RX ADMIN — DEXTROSE MONOHYDRATE 25 G: 25 INJECTION, SOLUTION INTRAVENOUS at 11:01

## 2017-09-26 RX ADMIN — MORPHINE SULFATE 4 MG: 2 INJECTION, SOLUTION INTRAMUSCULAR; INTRAVENOUS at 13:41

## 2017-09-26 RX ADMIN — IOPAMIDOL 95 ML: 612 INJECTION, SOLUTION INTRAVENOUS at 11:46

## 2017-09-26 RX ADMIN — SODIUM CHLORIDE 100 ML/HR: 900 INJECTION, SOLUTION INTRAVENOUS at 20:56

## 2017-09-26 NOTE — IP AVS SNAPSHOT
[address] [place] [address] 
[phone] Patient: Sharda Wise MRN: [id number] You are allergic to the following Allergen Reactions Hay Fever And Allergy Relief Other (comments) Other/intolerance Penicillins Anaphylaxis Angioedema Vioxx (Rofecoxib) Other (comments) Other/intolerance Recent Documentation Height Weight BMI Smoking Status 1.829 m 68.2 kg 20.38 kg/m2 Current Every Day Smoker Emergency Contacts Name Discharge Info Relation Home Work Mobile [address] [facility] CAREGIVER [3] Spouse [3] [phone] About your hospitalization You were admitted on:  September 26, 2017 You last received care in the:  [address] You were discharged on:  September 28, 2017 Unit phone number:  [phone] Why you were hospitalized Your primary diagnosis was:  Liver Cancer, Primary, With Metastasis From Liver To Other Site (Hcc) Your diagnoses also included:  Hepatitis C, Gout, Type Ii Diabetes Mellitus With Complication (Hcc), Hypoglycemia Due To Insulin Providers Seen During Your Hospitalizations Provider Role Specialty Primary office phone Pat Reed DO Attending Provider Emergency Medicine [phone] Maximiliano Lemus MD Attending Provider Emergency Medicine [phone] Lanie Victoria DO Attending Provider Internal Medicine [phone] Your Primary Care Physician (PCP) Primary Care Physician Office Phone Office Fax Lissette Kelleythers [phone] Follow-up Information Follow up With Details Comments Contact Info Izabella Glover MD  Oct 6 1:40pm appointment [address] [place] [phone] 
[phone] Jaja Castañeda MD On 10/4/2017 830am [address] [place] [address] 
[phone] Current Discharge Medication List  
  
START taking these medications Dose & Instructions Dispensing Information Comments Morning Noon Evening Bedtime  
 ondansetron 8 mg disintegrating tablet Commonly known as:  ZOFRAN ODT Your last dose was: Your next dose is:    
   
   
 Dose:  8 mg Take 1 Tab by mouth every eight (8) hours as needed for Nausea. Quantity:  30 Tab Refills:  1  
     
   
   
   
  
 oxyCODONE IR 5 mg immediate release tablet Commonly known as:  [place] Your last dose was: Your next dose is:    
   
   
 Dose:  5-10 mg Take 1-2 Tabs by mouth every six (6) hours as needed for Pain. Max Daily Amount: 40 mg.  
 Quantity:  45 Tab Refills:  0 CONTINUE these medications which have CHANGED Dose & Instructions Dispensing Information Comments Morning Noon Evening Bedtime * insulin detemir 100 unit/mL injection Commonly known as:  LEVEMIR What changed:  how much to take Your last dose was: Your next dose is:    
   
   
 Dose:  20 Units 20 Units by SubCUTAneous route nightly. Quantity:  1 Vial  
Refills:  1  
     
   
   
   
  
 * insulin detemir 100 unit/mL (3 mL) Inpn Commonly known as:  Yeni Pride What changed:  how much to take Your last dose was: Your next dose is:    
   
   
 Dose:  4 Units 4 Units by SubCUTAneous route nightly. Quantity:  1 Adjustable Dose Pre-filled Pen Syringe Refills:  1  
     
   
   
   
  
 insulin lispro 100 unit/mL injection Commonly known as:  HumaLOG What changed:   
- how much to take - Another medication with the same name was removed. Continue taking this medication, and follow the directions you see here. Your last dose was: Your next dose is:    
   
   
 Dose:  10 Units 10 Units by SubCUTAneous route Before breakfast, lunch, and dinner. Quantity:  1 Vial  
Refills:  3 * Notice: This list has 2 medication(s) that are the same as other medications prescribed for you. Read the directions carefully, and ask your doctor or other care provider to review them with you. CONTINUE these medications which have NOT CHANGED Dose & Instructions Dispensing Information Comments Morning Noon Evening Bedtime VITAMIN D2 50,000 unit capsule Generic drug:  ergocalciferol Your last dose was: Your next dose is:    
   
   
 Dose:  [address]791 Units Take 50,000 Units by mouth. Refills:  0 STOP taking these medications   
 metFORMIN 500 mg tablet Commonly known as:  GLUCOPHAGE Where to Get Your Medications These medications were sent to [address], [address]  [address] [id number] Phone:  [phone] insulin detemir 100 unit/mL injection Information on where to get these meds will be given to you by the nurse or doctor. ! Ask your nurse or doctor about these medications  
  insulin detemir 100 unit/mL (3 mL) Inpn  
 insulin lispro 100 unit/mL injection  
 ondansetron 8 mg disintegrating tablet  
 oxyCODONE IR 5 mg immediate release tablet Discharge Instructions DISCHARGE SUMMARY from Nurse The following personal items are in your possession at time of discharge: 
 
Dental Appliances: None Visual Aid: None, Glasses, With patient Home Medications: None Jewelry: None Clothing: Socks, Undergarments, Pants, Shirt, Footwear Other Valuables: Cell Phone, Eyeglasses, [address] [place], [id number], Personal electronic devices (comment) PATIENT INSTRUCTIONS: 
 
 
F-face looks uneven A-arms unable to move or move unevenly S-speech slurred or non-existent T-time-call 911 as soon as signs and symptoms begin-DO NOT go Back to bed or wait to see if you get better-TIME IS BRAIN. Warning Signs of HEART ATTACK Call 911 if you have these symptoms: 
? Chest discomfort. Most heart attacks involve discomfort in the center of the chest that lasts more than a few minutes, or that goes away and comes back. It can feel like uncomfortable pressure, squeezing, fullness, or pain. ? Discomfort in other areas of the upper body. Symptoms can include pain or discomfort in one or both arms, the back, neck, jaw, or stomach. ? Shortness of breath with or without chest discomfort. ? Other signs may include breaking out in a cold sweat, nausea, or lightheadedness. Don't wait more than five minutes to call [address]! Fast action can save your life. Calling 911 is almost always the fastest way to get lifesaving treatment. Emergency Medical Services staff can begin treatment when they arrive  up to an hour sooner than if someone gets to the hospital by car. The discharge information has been reviewed with the patient. The patient verbalized understanding. Discharge medications reviewed with the patient and appropriate educational materials and side effects teaching were provided. Wallit Activation Thank you for enrolling in [facility]. Please follow the instructions below to securely access your online medical record. Wallit allows you to send messages to your doctor, view your test results, renew your prescriptions, schedule appointments, and more. How Do I Sign Up? 1. In your internet browser, go to [email or web address]. 2. Click on the First Time User? Click Here link in the Sign In box. You will see the New Member Sign Up page. 3. Enter your Xtalic Access Code exactly as it appears below. You will not need to use this code after youve completed the sign-up process. If you do not sign up before the expiration date, you must request a new code. Xtalic Access Code: [email or web address] Expires: 12/25/2017  2:21 PM  
 
4. Enter the last four digits of your Social Security Number (xxxx) and Date of Birth (mm/dd/yyyy) as indicated and click Submit. You will be taken to the next sign-up page. 5. Create a Xtalic ID. This will be your Xtalic login ID and cannot be changed, so think of one that is secure and easy to remember. 6. Create a Xtalic password. You can change your password at any time. 7. Enter your Password Reset Question and Answer. This can be used at a later time if you forget your password. 8. Enter your e-mail address. You will receive e-mail notification when new information is available in [address] 9. Click Sign Up. You can now view your medical record. Additional Information Remember, Xtalic is NOT to be used for urgent needs. For medical emergencies, dial 911. Now available from your iPhone and Android! Discharge Instructions Attachments/References LIVER BIOPSY: PERCUTANEOUS: POST-OP (ENGLISH) Discharge Orders None Xtalic Announcement We are excited to announce that we are making your provider's discharge notes available to you in Xtalic. You will see these notes when they are completed and signed by the physician that discharged you from your recent hospital stay. If you have any questions or concerns about any information you see in Xtalic, please call the Health Information Department where you were seen or reach out to your Primary Care Provider for more information about your plan of care. Introducing [address]!    
 Shila Singh introduces Xtalic patient portal. Now you can access parts of your medical record, email your doctor's office, and request medication refills online. 1. In your internet browser, go to [email or web address] 2. Click on the First Time User? Click Here link in the Sign In box. You will see the New Member Sign Up page. 3. Enter your Run3D Access Code exactly as it appears below. You will not need to use this code after youve completed the sign-up process. If you do not sign up before the expiration date, you must request a new code. · Run3D Access Code: [email or web address] Expires: 12/25/2017  2:21 PM 
 
4. Enter the last four digits of your Social Security Number (xxxx) and Date of Birth (mm/dd/yyyy) as indicated and click Submit. You will be taken to the next sign-up page. 5. Create a Run3D ID. This will be your Run3D login ID and cannot be changed, so think of one that is secure and easy to remember. 6. Create a Run3D password. You can change your password at any time. 7. Enter your Password Reset Question and Answer. This can be used at a later time if you forget your password. 8. Enter your e-mail address. You will receive e-mail notification when new information is available in [address] 9. Click Sign Up. You can now view and download portions of your medical record. 10. Click the Download Summary menu link to download a portable copy of your medical information. If you have questions, please visit the Frequently Asked Questions section of the Run3D website. Remember, Run3D is NOT to be used for urgent needs. For medical emergencies, dial 911. Now available from your iPhone and Android! General Information Please provide this summary of care documentation to your next provider. Patient Signature:  ____________________________________________________________ Date:  ____________________________________________________________  
  
Iesha Feller Provider Signature:  ____________________________________________________________ Date:  ____________________________________________________________ More Information Percutaneous Liver Biopsy: What to Expect at Home Your Recovery Percutaneous liver biopsy is a procedure to take a tiny sample (biopsy) of your liver tissue. Percutaneous (say \"per-anusha-ESTEBAN-nee-us) means \"through the skin. \" The procedure is also called aspiration biopsy or fine-needle aspiration. The tissue sample is looked at under a microscope. Your doctor can look for infection or other liver problems. You may have some pain where the biopsy needle entered your skin (the puncture site). You may also have pain in your shoulder. This is called referred pain. It is caused by pain traveling along a nerve near the biopsy site. The referred pain usually lasts less than 12 hours. You may have a small amount of bleeding from the puncture site. You will need to take it easy at home for 1 or 2 days after the procedure. You will probably be able to return to work and most of your usual activities after that. This care sheet gives you a general idea about how long it will take for you to recover. But each person recovers at a different pace. Follow the steps below to get better as quickly as possible. How can you care for yourself at home? Activity · Rest when you feel tired. Getting enough sleep will help you recover. · Try to walk each day. Start by walking a little more than you did the day before. Bit by bit, increase the amount you walk. Walking boosts blood flow and helps prevent pneumonia and constipation. · Avoid exercises that use your belly muscles and strenuous activities, such as bicycle riding, jogging, weight lifting, or aerobic exercise, for 1 week or until your doctor says it is okay. · Ask your doctor when you can drive again. · You will probably need to take 1 or 2 days off from work. It depends on the type of work you do and how you feel. · You will probably be able to shower the same day as the test, if your doctor says it is okay. Pat the puncture site dry. Do not take a bath for at least 2 days after the test, or until your doctor tells you it is okay. Diet · You can eat your normal diet. If your stomach is upset, try bland, low-fat foods like plain rice, broiled chicken, toast, and yogurt. · Drink plenty of fluids (unless your doctor tells you not to). Medicines · Your doctor will tell you if and when you can restart your medicines. He or she will also give you instructions about taking any new medicines. · If you take blood thinners, such as warfarin (Coumadin), clopidogrel (Plavix), or aspirin, be sure to talk to your doctor. He or she will tell you if and when to start taking those medicines again. Make sure that you understand exactly what your doctor wants you to do. · Be safe with medicines. Take pain medicines exactly as directed. ¨ If the doctor gave you a prescription medicine for pain, take it as prescribed. ¨ If you are not taking a prescription pain medicine, take an over-the-counter medicine that your doctor recommends. Read and follow all instructions on the label. ¨ Do not take aspirin, ibuprofen (Advil, Motrin), naproxen (Aleve), or other nonsteroidal anti-inflammatory drugs (NSAIDs) unless your doctor says it is okay. · If you think your pain medicine is making you sick to your stomach: 
¨ Take your medicine after meals (unless your doctor has told you not to). ¨ Ask your doctor for a different kind of pain medicine. Care of the puncture site · Keep a bandage over the puncture site for the first 1 or 2 days. Follow-up care is a key part of your treatment and safety. Be sure to make and go to all appointments, and call your doctor if you are having problems. It's also a good idea to know your test results and keep a list of the medicines you take. When should you call for help? Call 911 anytime you think you may need emergency care. For example, call if: 
· You passed out (lost consciousness). · You have severe trouble breathing. · You have sudden chest pain and shortness of breath, or you cough up blood. · You have severe pain in your chest, shoulder, or belly. Call your doctor now or seek immediate medical care if: 
· You have new or worse shortness of breath. · Bright red blood has soaked through the bandage over the puncture site. · You have pain that does not get better after you take your pain medicine. · You are sick to your stomach or cannot keep fluids down. · You have a fever, chills, or body aches. · You have signs of infection, such as: 
¨ Increased pain, swelling, warmth, or redness. ¨ Red streaks leading from the puncture site. ¨ Pus draining from the puncture site. ¨ A fever. · You have new or worse pain at the puncture site. · You have new or worse belly swelling or bloating. · You have trouble passing urine or stool. · Your stools are black and tarlike or have streaks of blood. · You have pale-colored stools along with dark urine and itching. Watch closely for changes in your health, and be sure to contact your doctor if you have any problems. Where can you learn more? Go to http://kailyn-law.info/. Enter Q443 in the search box to learn more about \"Percutaneous Liver Biopsy: What to Expect at Home. \" Current as of: October 14, 2016 Content Version: 11.3 © 1949-[address]. Care instructions adapted under license by [employer] (which disclaims liability or warranty for this information). If you have questions about a medical condition or this instruction, always ask your healthcare professional. [address] any warranty or liability for your use of this information.

## 2017-09-26 NOTE — ED TRIAGE NOTES
Pt with 1 week of chest and abdominal pain that started 1 week ago with loss of appetite. Associated symptoms are diaphoresis and cold. Pt takes insulin daily and continuing to not ea.   BS 44 by EMS, no treatmens

## 2017-09-26 NOTE — ED PROVIDER NOTES
HPI Comments: Lennox Nancy is a 59 y.o. Male who presents to the ED for continued abdominal pain and chills. Returns to ED after getting his \"affairs in order. \" No other symptoms or concerns were expressed. The history is provided by the patient. Past Medical History:   Diagnosis Date    BPH (benign prostatic hypertrophy) with urinary retention     Chronic low back pain     Diabetes (HCC)     DJD (degenerative joint disease)     ED (erectile dysfunction)     Elevated PSA     Glucose intolerance (impaired glucose tolerance)     Gout     Hepatitis C     Herniated disc     History of total knee replacement 3/2006    Hyperlipidemia     Incomplete emptying of bladder     Lupus ([facility] [address]     Osteoarthritis     Sleep apnea        Past Surgical History:   Procedure Laterality Date    HX HEART CATHETERIZATION  2006    HX KNEE ARTHROSCOPY      HX KNEE REPLACEMENT  3/2006    HX UROLOGICAL  11/06/2012    [facility], [facility],          History reviewed. No pertinent family history. Social History     Social History    Marital status:      Spouse name: N/A    Number of children: N/A    Years of education: N/A     Occupational History    Not on file. Social History Main Topics    Smoking status: Current Every Day Smoker     Packs/day: 0.50    Smokeless tobacco: Never Used    Alcohol use No    Drug use: No    Sexual activity: Not on file     Other Topics Concern    Not on file     Social History Narrative         ALLERGIES: Hay fever and allergy relief; Penicillins; and Vioxx [rofecoxib]    Review of Systems   Constitutional: Positive for chills. Negative for diaphoresis and fever. HENT: Negative for congestion and sore throat. Eyes: Negative for pain and itching. Respiratory: Negative for cough and shortness of breath. Cardiovascular: Negative for chest pain and palpitations. Gastrointestinal: Positive for abdominal pain. Negative for diarrhea.    Endocrine: Negative for polydipsia and polyuria. Genitourinary: Negative for dysuria and hematuria. Musculoskeletal: Negative for arthralgias and myalgias. Skin: Negative for rash and wound. Neurological: Negative for seizures and syncope. Hematological: Does not bruise/bleed easily. Psychiatric/Behavioral: Negative for agitation and hallucinations. Vitals:    09/26/17 1556 09/26/17 1635 09/26/17 1638 09/26/17 1641   BP: 97/70 114/80     Pulse: (!) 55  (!) 127 (!) 151   Resp: 17  16 29   Temp: 98 °F (36.7 °C)      SpO2: 100%  96% 100%   Weight: 68.5 kg (151 lb)               Physical Exam   Constitutional: He appears well-developed and well-nourished. HENT:   Head: Normocephalic and atraumatic. Eyes: Conjunctivae are normal. No scleral icterus. Neck: Normal range of motion. Neck supple. No JVD present. Cardiovascular: Regular rhythm and normal heart sounds. Tachycardia present. 4 intact extremity pulses   Pulmonary/Chest: Effort normal and breath sounds normal.   Abdominal: Soft. He exhibits no mass. There is tenderness in the epigastric area. Musculoskeletal: Normal range of motion. Lymphadenopathy:     He has no cervical adenopathy. Neurological: He is alert. Skin: Skin is warm and dry. Nursing note and vitals reviewed.        MDM  Number of Diagnoses or Management Options  Diagnosis management comments: Patient returns for admission for progression of carcinoma    ED Course       Procedures    Vitals:  Patient Vitals for the past 12 hrs:   Temp Pulse Resp BP SpO2   09/26/17 1641 - (!) 151 29 - 100 %   09/26/17 1638 - (!) 127 16 - 96 %   09/26/17 1635 - - - 114/80 -   09/26/17 1556 98 °F (36.7 °C) (!) 55 17 97/70 100 %       Medications ordered:   Medications   morphine injection 4 mg (4 mg IntraVENous Given 9/26/17 1656)         Lab findings:  Recent Results (from the past 12 hour(s))   EKG, 12 LEAD, INITIAL    Collection Time: 09/26/17 11:01 AM   Result Value Ref Range    Ventricular Rate 95 BPM Atrial Rate 95 BPM    P-R Interval 132 ms    QRS Duration 88 ms    Q-T Interval 358 ms    QTC Calculation (Bezet) 449 ms    Calculated P Axis 45 degrees    Calculated R Axis -41 degrees    Calculated T Axis 58 degrees    Diagnosis       Sinus rhythm with occasional premature ventricular complexes  Left axis deviation  Nonspecific T wave abnormality  Abnormal ECG  When compared with ECG of 16-MAY-2015 23:19,  premature ventricular complexes are now present  ST now depressed in Anterior leads  Nonspecific T wave abnormality now evident in Anterolateral leads     CBC WITH AUTOMATED DIFF    Collection Time: 09/26/17 11:10 AM   Result Value Ref Range    WBC 13.9 (H) 4.6 - 13.2 K/uL    RBC 5.52 (H) 4.70 - 5.50 M/uL    HGB 16.1 (H) 13.0 - 16.0 g/dL    HCT 46.7 36.0 - 48.0 %    MCV 84.6 74.0 - 97.0 FL    MCH 29.2 24.0 - 34.0 PG    MCHC 34.5 31.0 - 37.0 g/dL    RDW 15.5 (H) 11.6 - 14.5 %    PLATELET 847 639 - 327 K/uL    MPV 10.0 9.2 - 11.8 FL    NEUTROPHILS 81 (H) 40 - 73 %    LYMPHOCYTES 6 (L) 21 - 52 %    MONOCYTES 11 (H) 3 - 10 %    EOSINOPHILS 2 0 - 5 %    BASOPHILS 0 0 - 2 %    ABS. NEUTROPHILS 11.4 (H) 1.8 - 8.0 K/UL    ABS. LYMPHOCYTES 0.8 (L) 0.9 - 3.6 K/UL    ABS. MONOCYTES 1.5 (H) 0.05 - 1.2 K/UL    ABS. EOSINOPHILS 0.2 0.0 - 0.4 K/UL    ABS.  BASOPHILS 0.0 0.0 - 0.06 K/UL    DF AUTOMATED     METABOLIC PANEL, BASIC    Collection Time: 09/26/17 11:10 AM   Result Value Ref Range    Sodium 136 136 - 145 mmol/L    Potassium 3.7 3.5 - 5.5 mmol/L    Chloride 99 (L) 100 - 108 mmol/L    CO2 27 21 - 32 mmol/L    Anion gap 10 3.0 - 18 mmol/L    Glucose 55 (L) 74 - 99 mg/dL    BUN 7 7.0 - 18 MG/DL    Creatinine 0.88 0.6 - 1.3 MG/DL    BUN/Creatinine ratio 8 (L) 12 - 20      GFR est AA >60 >60 ml/min/1.73m2    GFR est non-AA >60 >60 ml/min/1.73m2    Calcium 9.4 8.5 - 10.1 MG/DL   TROPONIN I    Collection Time: 09/26/17 11:10 AM   Result Value Ref Range    Troponin-I, Qt. <0.02 0.0 - 0.045 NG/ML   LIPASE    Collection Time: 09/26/17 11:10 AM   Result Value Ref Range    Lipase 75 73 - 393 U/L   HEPATIC FUNCTION PANEL    Collection Time: 09/26/17 11:10 AM   Result Value Ref Range    Protein, total 7.4 6.4 - 8.2 g/dL    Albumin 2.6 (L) 3.4 - 5.0 g/dL    Globulin 4.8 (H) 2.0 - 4.0 g/dL    A-G Ratio 0.5 (L) 0.8 - 1.7      Bilirubin, total 1.3 (H) 0.2 - 1.0 MG/DL    Bilirubin, direct 0.8 (H) 0.0 - 0.2 MG/DL    Alk. phosphatase 422 (H) 45 - 117 U/L    AST (SGOT) 56 (H) 15 - 37 U/L    ALT (SGPT) 49 16 - 61 U/L   GLUCOSE, POC    Collection Time: 09/26/17 11:40 AM   Result Value Ref Range    Glucose (POC) 93 70 - 110 mg/dL   GLUCOSE, POC    Collection Time: 09/26/17 12:23 PM   Result Value Ref Range    Glucose (POC) 87 70 - 110 mg/dL   GLUCOSE, POC    Collection Time: 09/26/17  1:21 PM   Result Value Ref Range    Glucose (POC) 60 (L) 70 - 110 mg/dL   GLUCOSE, POC    Collection Time: 09/26/17  2:37 PM   Result Value Ref Range    Glucose (POC) 91 70 - 110 mg/dL       Progress notes, Consult notes or additional Procedure notes:   Consult:  Discussed care with Dr. Whitt, hospitalist. Standard discussion; including history of patients chief complaint, available diagnostic results, and treatment course. Will see patient and admit.  4:52 PM, 9/26/2017       Reevaluation of patient:     Disposition:  Diagnosis:   1. Hepatic carcinoma ([facility] [address]        Disposition: Admit. Follow-up Information     None           Patient's Medications   Start Taking    No medications on file   Continue Taking    ERGOCALCIFEROL (VITAMIN D2) 50,000 UNIT CAPSULE    Take 50,000 Units by mouth. INSULIN DETEMIR (LEVEMIR) 100 UNIT/ML INJECTION    0.45 mL by SubCUTAneous route nightly. INSULIN LISPRO (HUMALOG) 100 UNIT/ML INJECTION    60 Units by SubCUTAneous route Before breakfast, lunch, and dinner. METFORMIN (GLUCOPHAGE) 500 MG TABLET    Take 1,000 mg by mouth two (2) times daily (with meals).    These Medications have changed    No medications on file   Stop Taking No medications on file         Scribe Attestation      Stephain acting as a scribe for and in the presence of Shakira Storm MD      September 26, 2017 at 4:52 PM       Provider Attestation:      I personally performed the services described in the documentation, reviewed the documentation, as recorded by the scribe in my presence, and it accurately and completely records my words and actions.  September 26, 2017 at 4:52 PM - Shakira Storm MD

## 2017-09-26 NOTE — H&P
History and Physical    Patient: Rosa Elena Coleman               Sex: male          DOA: 9/26/2017       YOB: 1960      Age:  59 y.o. Assessment/Plan     Hypoglycemia - hold levemir till tomorrow. Keep eating/drink juice. SSI AC/HS  Liver CA with metastasis - primary source liver? Need Biopsy IR consult placed. VOA consulted. NPO @ midnight  HCV - s/p treatment  IDDM - see above. Hx of gout - not on treatment. Was on colchicine. BPH - not on treatment  Malnutrition - mild    Code status: Full  PPX:  DVT: heparin   GI: None indicated    HPI:     Chief Complaint   Patient presents with    Abdominal Pain       Toby Shaikh is a 59 y.o. male with PMHX of IDDM, HCV, Gout and recent liver CA who presents with 1 week of sweating, weakness, and back pain. States he has had anorexia since a procedure in July, possible a liver biopsy. He denies any other N/V/D or bowel symptoms. He follows with Dr Destiny Stone for liver cancer and states he had chemotherapy in July, no records in epic. He has HCV but this was treated prior to chemotherapy. CT abd reveals increased peritoneal fluid, multiple liver masses, and retroperitoneal and pelvic adenopathy. Dr Alexandra Khalil contacted. Despite code discussion patient still wants to be full code. Past Medical History:   Diagnosis Date    BPH (benign prostatic hypertrophy) with urinary retention     Chronic low back pain     Diabetes (HCC)     DJD (degenerative joint disease)     ED (erectile dysfunction)     Elevated PSA     Glucose intolerance (impaired glucose tolerance)     Gout     Hepatitis C     Herniated disc     History of total knee replacement 3/2006    Hyperlipidemia     Incomplete emptying of bladder     Lupus (HCC)     Osteoarthritis     Sleep apnea        Prior to Admission Medications   Prescriptions Last Dose Informant Patient Reported?  Taking?   ergocalciferol (VITAMIN D2) 50,000 unit capsule   Yes No   Sig: Take 50,000 Units by mouth. insulin detemir (LEVEMIR) 100 unit/mL injection   No No   Si.45 mL by SubCUTAneous route nightly. Patient taking differently: 60 Units by SubCUTAneous route nightly. insulin lispro (HUMALOG) 100 unit/mL injection   Yes No   Si Units by SubCUTAneous route Before breakfast, lunch, and dinner. metFORMIN (GLUCOPHAGE) 500 mg tablet   Yes No   Sig: Take 1,000 mg by mouth two (2) times daily (with meals). Facility-Administered Medications: None       Social History:  Social History     Social History    Marital status:      Spouse name: N/A    Number of children: N/A    Years of education: N/A     Occupational History    Not on file. Social History Main Topics    Smoking status: Current Every Day Smoker     Packs/day: 0.50    Smokeless tobacco: Never Used    Alcohol use No    Drug use: No    Sexual activity: Not on file     Other Topics Concern    Not on file     Social History Narrative       Family History:  History reviewed. No pertinent family history. Surgical History:  Past Surgical History:   Procedure Laterality Date    HX HEART CATHETERIZATION      HX KNEE ARTHROSCOPY      HX KNEE REPLACEMENT  3/2006    HX UROLOGICAL  2012    [facility], [facility],        Review of Systems  Constitutional:  + fever + weight loss  HEENT:  No headache or visual changes  Cardiovascular:  No chest pain or diaphoresis  Respiratory:  No coughing, wheezing, or shortness of breath. GI:  No nausea or vomitting.   No diarrhea  :  No hematuria or dysuria  Skin:  No rashes or moles  Neuro:  No seizures or syncope  Hematological:  No bruising or bleeding  Endocrine:  No diabetes or thyroid disease    Physical Exam:      Visit Vitals    /80    Pulse (!) 151    Temp 98 °F (36.7 °C)    Resp 29    Wt 68.5 kg (151 lb)    SpO2 100%    BMI 20.48 kg/m2       Physical Exam:  Gen:  No distress, alert  HEENT:  Normal cephalic atraumatic, extra-occular movements are intact. Neck:  Supple, No JVD  Lungs:  Clear bilaterally, no wheeze, no rales, normal effort  Heart:  Regular Rate and Rhythm, normal S1 and S2, no edema  Abdomen:  Soft, non tender,slight distension, normal bowel sounds, no guarding. Extremities:  Well perfused, no cyanosis or edema  Neurological:  Awake and alert, CN's are intact, normal strength throughout extremities  Skin:  No rashes or moles  Psych:  Normal thought process, does not appear anxious    Laboratory Studies: All lab results for the last 24 hours reviewed. Recent Results (from the past 12 hour(s))   EKG, 12 LEAD, INITIAL    Collection Time: 09/26/17 11:01 AM   Result Value Ref Range    Ventricular Rate 95 BPM    Atrial Rate 95 BPM    P-R Interval 132 ms    QRS Duration 88 ms    Q-T Interval 358 ms    QTC Calculation (Bezet) 449 ms    Calculated P Axis 45 degrees    Calculated R Axis -41 degrees    Calculated T Axis 58 degrees    Diagnosis       Sinus rhythm with occasional premature ventricular complexes  Left axis deviation  Nonspecific T wave abnormality  Abnormal ECG  When compared with ECG of 16-MAY-2015 23:19,  premature ventricular complexes are now present  ST now depressed in Anterior leads  Nonspecific T wave abnormality now evident in Anterolateral leads     CBC WITH AUTOMATED DIFF    Collection Time: 09/26/17 11:10 AM   Result Value Ref Range    WBC 13.9 (H) 4.6 - 13.2 K/uL    RBC 5.52 (H) 4.70 - 5.50 M/uL    HGB 16.1 (H) 13.0 - 16.0 g/dL    HCT 46.7 36.0 - 48.0 %    MCV 84.6 74.0 - 97.0 FL    MCH 29.2 24.0 - 34.0 PG    MCHC 34.5 31.0 - 37.0 g/dL    RDW 15.5 (H) 11.6 - 14.5 %    PLATELET 908 297 - 123 K/uL    MPV 10.0 9.2 - 11.8 FL    NEUTROPHILS 81 (H) 40 - 73 %    LYMPHOCYTES 6 (L) 21 - 52 %    MONOCYTES 11 (H) 3 - 10 %    EOSINOPHILS 2 0 - 5 %    BASOPHILS 0 0 - 2 %    ABS. NEUTROPHILS 11.4 (H) 1.8 - 8.0 K/UL    ABS. LYMPHOCYTES 0.8 (L) 0.9 - 3.6 K/UL    ABS. MONOCYTES 1.5 (H) 0.05 - 1.2 K/UL    ABS.  EOSINOPHILS 0.2 0.0 - 0.4 K/UL ABS. BASOPHILS 0.0 0.0 - 0.06 K/UL    DF AUTOMATED     METABOLIC PANEL, BASIC    Collection Time: 09/26/17 11:10 AM   Result Value Ref Range    Sodium 136 136 - 145 mmol/L    Potassium 3.7 3.5 - 5.5 mmol/L    Chloride 99 (L) 100 - 108 mmol/L    CO2 27 21 - 32 mmol/L    Anion gap 10 3.0 - 18 mmol/L    Glucose 55 (L) 74 - 99 mg/dL    BUN 7 7.0 - 18 MG/DL    Creatinine 0.88 0.6 - 1.3 MG/DL    BUN/Creatinine ratio 8 (L) 12 - 20      GFR est AA >60 >60 ml/min/1.73m2    GFR est non-AA >60 >60 ml/min/1.73m2    Calcium 9.4 8.5 - 10.1 MG/DL   TROPONIN I    Collection Time: 09/26/17 11:10 AM   Result Value Ref Range    Troponin-I, Qt. <0.02 0.0 - 0.045 NG/ML   LIPASE    Collection Time: 09/26/17 11:10 AM   Result Value Ref Range    Lipase 75 73 - 393 U/L   HEPATIC FUNCTION PANEL    Collection Time: 09/26/17 11:10 AM   Result Value Ref Range    Protein, total 7.4 6.4 - 8.2 g/dL    Albumin 2.6 (L) 3.4 - 5.0 g/dL    Globulin 4.8 (H) 2.0 - 4.0 g/dL    A-G Ratio 0.5 (L) 0.8 - 1.7      Bilirubin, total 1.3 (H) 0.2 - 1.0 MG/DL    Bilirubin, direct 0.8 (H) 0.0 - 0.2 MG/DL    Alk.  phosphatase 422 (H) 45 - 117 U/L    AST (SGOT) 56 (H) 15 - 37 U/L    ALT (SGPT) 49 16 - 61 U/L   GLUCOSE, POC    Collection Time: 09/26/17 11:40 AM   Result Value Ref Range    Glucose (POC) 93 70 - 110 mg/dL   GLUCOSE, POC    Collection Time: 09/26/17 12:23 PM   Result Value Ref Range    Glucose (POC) 87 70 - 110 mg/dL   GLUCOSE, POC    Collection Time: 09/26/17  1:21 PM   Result Value Ref Range    Glucose (POC) 60 (L) 70 - 110 mg/dL   GLUCOSE, POC    Collection Time: 09/26/17  2:37 PM   Result Value Ref Range    Glucose (POC) 91 70 - 110 mg/dL       Rad:  CT abd 9/26

## 2017-09-26 NOTE — ED NOTES
Patient was in ED earlier today and discharged to get affairs in order to be admitted. Pt returned to ED with belongings. Patient given a warm blanket and IV started.

## 2017-09-26 NOTE — ED NOTES
Discharge instructions reviewed with patient. BS checked at discharge and pt provided food. Pt to return to ER after he gets his \"affairs in order. \" pt ambulated to waiting room with a steady gait with cane for ride home.

## 2017-09-26 NOTE — ED PROVIDER NOTES
HPI Comments: Rodri James is a 59 y.o. Male with PMHx of DM who presents to the ED with c/o CP, hypoglycemia and abdominal pain for the past week. Associated symptoms include decreased appetite, diaphoresis and chills. Patient admits he has been compliant with insulin. BG was 44 per EMS. Denies diarrhea. No other symptoms or concerns were expressed. The history is provided by the patient and the EMS personnel. Past Medical History:   Diagnosis Date    BPH (benign prostatic hypertrophy) with urinary retention     Chronic low back pain     Diabetes (HCC)     DJD (degenerative joint disease)     ED (erectile dysfunction)     Elevated PSA     Glucose intolerance (impaired glucose tolerance)     Gout     Hepatitis C     Herniated disc     History of total knee replacement 3/2006    Hyperlipidemia     Incomplete emptying of bladder     Lupus ([facility] [address]     Osteoarthritis     Sleep apnea        Past Surgical History:   Procedure Laterality Date    HX HEART CATHETERIZATION  2006    HX KNEE ARTHROSCOPY      HX KNEE REPLACEMENT  3/2006    HX UROLOGICAL  11/06/2012    [facility],          History reviewed. No pertinent family history. Social History     Social History    Marital status:      Spouse name: N/A    Number of children: N/A    Years of education: N/A     Occupational History    Not on file. Social History Main Topics    Smoking status: Current Every Day Smoker     Packs/day: 0.50    Smokeless tobacco: Never Used    Alcohol use No    Drug use: No    Sexual activity: Not on file     Other Topics Concern    Not on file     Social History Narrative         ALLERGIES: Hay fever and allergy relief; Penicillins; and Vioxx [rofecoxib]    Review of Systems   Constitutional: Positive for appetite change (decreased), chills and diaphoresis. Negative for fever. Hypoglycemia   HENT: Negative for congestion and sore throat. Eyes: Negative for pain and itching. Respiratory: Negative for cough and shortness of breath. Cardiovascular: Positive for chest pain. Negative for palpitations. Gastrointestinal: Positive for abdominal pain. Negative for diarrhea. Endocrine: Negative for polydipsia and polyuria. Genitourinary: Negative for dysuria and hematuria. Musculoskeletal: Negative for arthralgias and myalgias. Skin: Negative for rash and wound. Neurological: Negative for seizures and syncope. Hematological: Does not bruise/bleed easily. Psychiatric/Behavioral: Negative for agitation and hallucinations. Vitals:    09/26/17 1059 09/26/17 1215   BP: 104/78 122/86   Pulse: 92 (!) 111   Resp: 24 28   Temp: 99.5 °F (37.5 °C)    SpO2: 100% (!) 85%            Physical Exam   Constitutional: He appears well-developed and well-nourished. Cool   HENT:   Head: Normocephalic and atraumatic. Eyes: Conjunctivae are normal. No scleral icterus. Neck: Normal range of motion. Neck supple. No JVD present. Cardiovascular: Normal rate, regular rhythm and normal heart sounds. 4 intact extremity pulses   Pulmonary/Chest: Effort normal and breath sounds normal.   Abdominal: Soft. He exhibits no mass. There is generalized tenderness. Questionable epigastric mass vs. Rectus abdominus muscle   Musculoskeletal: Normal range of motion. Lymphadenopathy:     He has no cervical adenopathy. Neurological: He is alert. Skin: Skin is warm and dry. Nursing note and vitals reviewed. MDM  Number of Diagnoses or Management Options  Diagnosis management comments: Will hydrate. Patient given glucose. Evaluate for intraabdominal pathology.     ED Course       Procedures    Vitals:  Patient Vitals for the past 12 hrs:   Temp Pulse Resp BP SpO2   09/26/17 1215 - (!) 111 28 122/86 (!) 85 %   09/26/17 1059 99.5 °F (37.5 °C) 92 24 104/78 100 %       Medications ordered:   Medications   sodium chloride 0.9 % bolus infusion 1,000 mL (1,000 mL IntraVENous New Bag 9/26/17 1221) dextrose (D50W) injection syrg 25 g (25 g IntraVENous Given 9/26/17 1101)   iopamidol (ISOVUE 300) 61 % contrast injection 100-200 mL (95 mL IntraVENous Given 9/26/17 1146)   LORazepam (ATIVAN) injection 0.5 mg (0.5 mg IntraVENous Given 9/26/17 1222)         Lab findings:  Recent Results (from the past 12 hour(s))   EKG, 12 LEAD, INITIAL    Collection Time: 09/26/17 11:01 AM   Result Value Ref Range    Ventricular Rate 95 BPM    Atrial Rate 95 BPM    P-R Interval 132 ms    QRS Duration 88 ms    Q-T Interval 358 ms    QTC Calculation (Bezet) 449 ms    Calculated P Axis 45 degrees    Calculated R Axis -41 degrees    Calculated T Axis 58 degrees    Diagnosis       Sinus rhythm with occasional premature ventricular complexes  Left axis deviation  Nonspecific T wave abnormality  Abnormal ECG  When compared with ECG of 16-MAY-2015 23:19,  premature ventricular complexes are now present  ST now depressed in Anterior leads  Nonspecific T wave abnormality now evident in Anterolateral leads     CBC WITH AUTOMATED DIFF    Collection Time: 09/26/17 11:10 AM   Result Value Ref Range    WBC 13.9 (H) 4.6 - 13.2 K/uL    RBC 5.52 (H) 4.70 - 5.50 M/uL    HGB 16.1 (H) 13.0 - 16.0 g/dL    HCT 46.7 36.0 - 48.0 %    MCV 84.6 74.0 - 97.0 FL    MCH 29.2 24.0 - 34.0 PG    MCHC 34.5 31.0 - 37.0 g/dL    RDW 15.5 (H) 11.6 - 14.5 %    PLATELET 365 880 - 744 K/uL    MPV 10.0 9.2 - 11.8 FL    NEUTROPHILS 81 (H) 40 - 73 %    LYMPHOCYTES 6 (L) 21 - 52 %    MONOCYTES 11 (H) 3 - 10 %    EOSINOPHILS 2 0 - 5 %    BASOPHILS 0 0 - 2 %    ABS. NEUTROPHILS 11.4 (H) 1.8 - 8.0 K/UL    ABS. LYMPHOCYTES 0.8 (L) 0.9 - 3.6 K/UL    ABS. MONOCYTES 1.5 (H) 0.05 - 1.2 K/UL    ABS. EOSINOPHILS 0.2 0.0 - 0.4 K/UL    ABS.  BASOPHILS 0.0 0.0 - 0.06 K/UL    DF AUTOMATED     METABOLIC PANEL, BASIC    Collection Time: 09/26/17 11:10 AM   Result Value Ref Range    Sodium 136 136 - 145 mmol/L    Potassium 3.7 3.5 - 5.5 mmol/L    Chloride 99 (L) 100 - 108 mmol/L    CO2 27 21 - 32 mmol/L    Anion gap 10 3.0 - 18 mmol/L    Glucose 55 (L) 74 - 99 mg/dL    BUN 7 7.0 - 18 MG/DL    Creatinine 0.88 0.6 - 1.3 MG/DL    BUN/Creatinine ratio 8 (L) 12 - 20      GFR est AA >60 >60 ml/min/1.73m2    GFR est non-AA >60 >60 ml/min/1.73m2    Calcium 9.4 8.5 - 10.1 MG/DL   TROPONIN I    Collection Time: 09/26/17 11:10 AM   Result Value Ref Range    Troponin-I, Qt. <0.02 0.0 - 0.045 NG/ML   LIPASE    Collection Time: 09/26/17 11:10 AM   Result Value Ref Range    Lipase 75 73 - 393 U/L   HEPATIC FUNCTION PANEL    Collection Time: 09/26/17 11:10 AM   Result Value Ref Range    Protein, total 7.4 6.4 - 8.2 g/dL    Albumin 2.6 (L) 3.4 - 5.0 g/dL    Globulin 4.8 (H) 2.0 - 4.0 g/dL    A-G Ratio 0.5 (L) 0.8 - 1.7      Bilirubin, total 1.3 (H) 0.2 - 1.0 MG/DL    Bilirubin, direct 0.8 (H) 0.0 - 0.2 MG/DL    Alk. phosphatase 422 (H) 45 - 117 U/L    AST (SGOT) 56 (H) 15 - 37 U/L    ALT (SGPT) 49 16 - 61 U/L   GLUCOSE, POC    Collection Time: 09/26/17 11:40 AM   Result Value Ref Range    Glucose (POC) 93 70 - 110 mg/dL   GLUCOSE, POC    Collection Time: 09/26/17 12:23 PM   Result Value Ref Range    Glucose (POC) 87 70 - 110 mg/dL       EKG interpretation by ED Physician:  11:01 NSR with PVCs @ 95 bpm. No acute process. X-Ray, CT or other radiology findings or impressions:  CT ABD PELV W CONT   Final Result      CT A/P:  1. Gross, necrotic periportal and retroperitoneal adenopathy encasing the  visceral branch vessels and anterior aorta. Multiple liver masses. Peroneal  carcinomatosis. Left pelvic adenopathy.      2. Subcentimeter left lower lobe subpleural nodule, nonspecific and for which  metastatic disease is not excluded.      3. Nonobstructive nephrolithiasis    Progress notes, Consult notes or additional Procedure notes:   Consult:  Discussed care with Dr. Vira Johnson, PCP. Standard discussion; including history of patients chief complaint, available diagnostic results, and treatment course.  Patient's oncologist is Dr. Elizabeth Cotton.  1:06 PM, 9/26/2017     2:21 PM Discussed CT findings with Dr Anders Peñaloza. Dr Anders Peñaloza is concerned by apparently rapid progression of disease, wants biopsy to rule out new cancer. Possibly time for hospice. I discussed this with pt, he's agreeable to admission but insists on going home to get things first.  I've advised against this as something might happen to him in the interim. Nevertheless pt wishes to be discharged and will return to be admitted. Of note, the last time I say him he signed out AMA, so it's possible he won't return. However at this time we have no cause to hold him against his will. Disposition:  Diagnosis:   1. Metastatic cancer to liver [facility])        Disposition: home, plan to return for admission    Follow-up Information     None           Patient's Medications   Start Taking    No medications on file   Continue Taking    ERGOCALCIFEROL (VITAMIN D2) 50,000 UNIT CAPSULE    Take 50,000 Units by mouth. INSULIN DETEMIR (LEVEMIR) 100 UNIT/ML INJECTION    0.45 mL by SubCUTAneous route nightly. INSULIN LISPRO (HUMALOG) 100 UNIT/ML INJECTION    60 Units by SubCUTAneous route Before breakfast, lunch, and dinner. METFORMIN (GLUCOPHAGE) 500 MG TABLET    Take 1,000 mg by mouth two (2) times daily (with meals). These Medications have changed    No medications on file   Stop Taking    No medications on file         Scribe Attestation      Stephani acting as a scribe for and in the presence of Erica Mcgowna MD      September 26, 2017 at 11:51 AM       Provider Attestation:      I personally performed the services described in the documentation, reviewed the documentation, as recorded by the scribe in my presence, and it accurately and completely records my words and actions.  September 26, 2017 at 11:51 AM - Erica Mcgowan MD

## 2017-09-26 NOTE — ED NOTES
I performed a brief evaluation, including history and physical, of the patient here in triage and I have determined that pt will need further treatment and evaluation from the main side ER physician. I have placed initial orders to help in expediting patients care. September 26, 2017 at 3:52 PM - Flor Barrera,         There were no vitals taken for this visit.

## 2017-09-26 NOTE — ED TRIAGE NOTES
Seen earlier for 1 week of abdominal pain. Left AMA to get things in order for admission. Elijah Bowling

## 2017-09-27 ENCOUNTER — APPOINTMENT (OUTPATIENT)
Dept: CT IMAGING | Age: 57
DRG: 436 | End: 2017-09-27
Attending: INTERNAL MEDICINE
Payer: MEDICARE

## 2017-09-27 PROBLEM — T38.3X5A HYPOGLYCEMIA DUE TO INSULIN: Status: ACTIVE | Noted: 2017-09-27

## 2017-09-27 PROBLEM — E16.0 HYPOGLYCEMIA DUE TO INSULIN: Status: ACTIVE | Noted: 2017-09-27

## 2017-09-27 PROBLEM — E11.8 TYPE II DIABETES MELLITUS WITH COMPLICATION (HCC): Status: ACTIVE | Noted: 2017-09-27

## 2017-09-27 LAB
ANION GAP SERPL CALC-SCNC: 12 MMOL/L (ref 3–18)
APTT PPP: 29.7 SEC (ref 23–36.4)
BUN SERPL-MCNC: 10 MG/DL (ref 7–18)
BUN/CREAT SERPL: 11 (ref 12–20)
CALCIUM SERPL-MCNC: 8.9 MG/DL (ref 8.5–10.1)
CHLORIDE SERPL-SCNC: 105 MMOL/L (ref 100–108)
CO2 SERPL-SCNC: 23 MMOL/L (ref 21–32)
CREAT SERPL-MCNC: 0.9 MG/DL (ref 0.6–1.3)
ERYTHROCYTE [DISTWIDTH] IN BLOOD BY AUTOMATED COUNT: 16.4 % (ref 11.6–14.5)
GLUCOSE BLD STRIP.AUTO-MCNC: 103 MG/DL (ref 70–110)
GLUCOSE BLD STRIP.AUTO-MCNC: 115 MG/DL (ref 70–110)
GLUCOSE BLD STRIP.AUTO-MCNC: 121 MG/DL (ref 70–110)
GLUCOSE BLD STRIP.AUTO-MCNC: 68 MG/DL (ref 70–110)
GLUCOSE BLD STRIP.AUTO-MCNC: 89 MG/DL (ref 70–110)
GLUCOSE BLD STRIP.AUTO-MCNC: 92 MG/DL (ref 70–110)
GLUCOSE SERPL-MCNC: 57 MG/DL (ref 74–99)
HCT VFR BLD AUTO: 44.2 % (ref 36–48)
HGB BLD-MCNC: 14.5 G/DL (ref 13–16)
INR PPP: 1.2 (ref 0.8–1.2)
MCH RBC QN AUTO: 28.7 PG (ref 24–34)
MCHC RBC AUTO-ENTMCNC: 32.8 G/DL (ref 31–37)
MCV RBC AUTO: 87.5 FL (ref 74–97)
PLATELET # BLD AUTO: 165 K/UL (ref 135–420)
PMV BLD AUTO: 10.4 FL (ref 9.2–11.8)
POTASSIUM SERPL-SCNC: 4.3 MMOL/L (ref 3.5–5.5)
PROTHROMBIN TIME: 14.9 SEC (ref 11.5–15.2)
RBC # BLD AUTO: 5.05 M/UL (ref 4.7–5.5)
SODIUM SERPL-SCNC: 140 MMOL/L (ref 136–145)
WBC # BLD AUTO: 9.9 K/UL (ref 4.6–13.2)

## 2017-09-27 PROCEDURE — 88333 PATH CONSLTJ SURG CYTO XM 1: CPT | Performed by: HOSPITALIST

## 2017-09-27 PROCEDURE — 88342 IMHCHEM/IMCYTCHM 1ST ANTB: CPT | Performed by: HOSPITALIST

## 2017-09-27 PROCEDURE — 74011250636 HC RX REV CODE- 250/636: Performed by: RADIOLOGY

## 2017-09-27 PROCEDURE — 77012 CT SCAN FOR NEEDLE BIOPSY: CPT

## 2017-09-27 PROCEDURE — 0FB03ZX EXCISION OF LIVER, PERCUTANEOUS APPROACH, DIAGNOSTIC: ICD-10-PCS | Performed by: RADIOLOGY

## 2017-09-27 PROCEDURE — 80048 BASIC METABOLIC PNL TOTAL CA: CPT | Performed by: INTERNAL MEDICINE

## 2017-09-27 PROCEDURE — 85610 PROTHROMBIN TIME: CPT | Performed by: RADIOLOGY

## 2017-09-27 PROCEDURE — 88341 IMHCHEM/IMCYTCHM EA ADD ANTB: CPT | Performed by: HOSPITALIST

## 2017-09-27 PROCEDURE — 74011636637 HC RX REV CODE- 636/637: Performed by: INTERNAL MEDICINE

## 2017-09-27 PROCEDURE — 88334 PATH CONSLTJ SURG CYTO XM EA: CPT | Performed by: HOSPITALIST

## 2017-09-27 PROCEDURE — 74011000250 HC RX REV CODE- 250: Performed by: RADIOLOGY

## 2017-09-27 PROCEDURE — 88307 TISSUE EXAM BY PATHOLOGIST: CPT | Performed by: HOSPITALIST

## 2017-09-27 PROCEDURE — 85027 COMPLETE CBC AUTOMATED: CPT | Performed by: INTERNAL MEDICINE

## 2017-09-27 PROCEDURE — 74011250636 HC RX REV CODE- 250/636: Performed by: INTERNAL MEDICINE

## 2017-09-27 PROCEDURE — 36415 COLL VENOUS BLD VENIPUNCTURE: CPT | Performed by: INTERNAL MEDICINE

## 2017-09-27 PROCEDURE — 65660000000 HC RM CCU STEPDOWN

## 2017-09-27 PROCEDURE — 85730 THROMBOPLASTIN TIME PARTIAL: CPT | Performed by: RADIOLOGY

## 2017-09-27 PROCEDURE — 74011000258 HC RX REV CODE- 258: Performed by: INTERNAL MEDICINE

## 2017-09-27 PROCEDURE — 82962 GLUCOSE BLOOD TEST: CPT

## 2017-09-27 PROCEDURE — 88313 SPECIAL STAINS GROUP 2: CPT | Performed by: HOSPITALIST

## 2017-09-27 RX ORDER — FENTANYL CITRATE 50 UG/ML
25-200 INJECTION, SOLUTION INTRAMUSCULAR; INTRAVENOUS
Status: DISCONTINUED | OUTPATIENT
Start: 2017-09-27 | End: 2017-09-27 | Stop reason: ALTCHOICE

## 2017-09-27 RX ORDER — DEXTROSE MONOHYDRATE AND SODIUM CHLORIDE 5; .45 G/100ML; G/100ML
75 INJECTION, SOLUTION INTRAVENOUS CONTINUOUS
Status: DISCONTINUED | OUTPATIENT
Start: 2017-09-27 | End: 2017-09-28 | Stop reason: HOSPADM

## 2017-09-27 RX ORDER — MIDAZOLAM HYDROCHLORIDE 1 MG/ML
.5-4 INJECTION, SOLUTION INTRAMUSCULAR; INTRAVENOUS
Status: DISCONTINUED | OUTPATIENT
Start: 2017-09-27 | End: 2017-09-27 | Stop reason: ALTCHOICE

## 2017-09-27 RX ORDER — INSULIN LISPRO 100 [IU]/ML
20 INJECTION, SOLUTION INTRAVENOUS; SUBCUTANEOUS
Qty: 1 VIAL | Refills: 3 | Status: SHIPPED
Start: 2017-09-27 | End: 2017-09-28

## 2017-09-27 RX ORDER — LIDOCAINE HYDROCHLORIDE 10 MG/ML
1-20 INJECTION INFILTRATION; PERINEURAL
Status: COMPLETED | OUTPATIENT
Start: 2017-09-27 | End: 2017-09-27

## 2017-09-27 RX ADMIN — INSULIN DETEMIR 20 UNITS: 100 INJECTION, SOLUTION SUBCUTANEOUS at 22:07

## 2017-09-27 RX ADMIN — DEXTROSE MONOHYDRATE AND SODIUM CHLORIDE 75 ML/HR: 5; .45 INJECTION, SOLUTION INTRAVENOUS at 08:14

## 2017-09-27 RX ADMIN — MORPHINE SULFATE 5 MG: 10 INJECTION INTRAMUSCULAR; INTRAVENOUS; SUBCUTANEOUS at 16:08

## 2017-09-27 RX ADMIN — HEPARIN SODIUM 5000 UNITS: 5000 INJECTION, SOLUTION INTRAVENOUS; SUBCUTANEOUS at 22:07

## 2017-09-27 RX ADMIN — MIDAZOLAM HYDROCHLORIDE 1 MG: 1 INJECTION, SOLUTION INTRAMUSCULAR; INTRAVENOUS at 15:04

## 2017-09-27 RX ADMIN — LIDOCAINE HYDROCHLORIDE 10 ML: 10 INJECTION, SOLUTION INFILTRATION; PERINEURAL at 15:04

## 2017-09-27 RX ADMIN — FENTANYL CITRATE 50 MCG: 50 INJECTION, SOLUTION INTRAMUSCULAR; INTRAVENOUS at 15:04

## 2017-09-27 RX ADMIN — MORPHINE SULFATE 5 MG: 10 INJECTION INTRAMUSCULAR; INTRAVENOUS; SUBCUTANEOUS at 20:14

## 2017-09-27 RX ADMIN — MIDAZOLAM HYDROCHLORIDE 1 MG: 1 INJECTION, SOLUTION INTRAMUSCULAR; INTRAVENOUS at 15:00

## 2017-09-27 RX ADMIN — FENTANYL CITRATE 50 MCG: 50 INJECTION, SOLUTION INTRAMUSCULAR; INTRAVENOUS at 15:14

## 2017-09-27 RX ADMIN — SODIUM BICARBONATE 1 ML: 0.2 INJECTION, SOLUTION INTRAVENOUS at 15:04

## 2017-09-27 RX ADMIN — FENTANYL CITRATE 50 MCG: 50 INJECTION, SOLUTION INTRAMUSCULAR; INTRAVENOUS at 15:00

## 2017-09-27 NOTE — PROGRESS NOTES
0730 Bedside, Verbal and Written shift change report given to [address] (oncoming nurse) by Teodoro Woods (offgoing nurse). Report included the following information SBAR, Kardex, Intake/Output, MAR and Recent Results. Pt NPO since midnight for CT BX guided NDL.    0800 -- Contacted by Radiology, advised pt has been NPO since midnight, associate to order STAT labs and advised to continue holding Heparin. 0900 -- Blood glucose reassessed, 92.    1140 -- IDR completed with pt, plan is to discharge pt after CT BX.     1200 -- Shift reassessment completed, no changes, will continue to monitor. 1415 -- Pt off unit for CT.    1600 -- Returned to unit. PRN pain medication administered, will continue to monitor. Paged MD Whitt pt back from procedure question possible discharge. MD Whitt advised to page MD Abiodun Elkins to assess pt prior to discharge. MD Abiodun Elkins page. 1700 -- Call returned by MD Anita Guzman advised that Abiodun Elkins has left for day, pt to be assessed tomorrow 09/28/17. Pt notified. 1800 -- Shift reassessment completed, no changes, will continue to monitor. 3016 -- Bedside, Verbal and Written shift change report given to Teodoro Woods (oncoming nurse) by Marcelo Blanco (offgoing nurse). Report included the following information SBAR, Kardex, Intake/Output, MAR and Recent Results.

## 2017-09-27 NOTE — PROGRESS NOTES
Daily Progress Note: 2017 1:49 PM   Admit Date: 2017    Patient seen in follow up for multiple medical problems as listed below:  Patient Active Problem List   Diagnosis Code    Chest pain R07.9    Suicidal thoughts R45.851    Homicidal ideations R45.850    Shoulder pain, left M25.512    History of knee replacement, total Z96.659    Hepatitis C B19.20    Hyperlipidemia E78.5    Gout M10.9    Sleep apnea G47.30    Elevated PSA R97.20    Microhematuria R31.29    Urinary retention R33.9    Hypertrophy of prostate with urinary obstruction and other lower urinary tract symptoms (LUTS) N40.1    Hyperglycemia R73.9    Liver cancer, primary, with metastasis from liver to other site [facility]) C22.8    Type II diabetes mellitus with complication (HCC) N68.9    Hypoglycemia due to insulin E16.0, T38.3X5A       Assesment     Hypoglycemia - hold levemir till tonight. Keep eating/drink juice. SSI AC/HS  Liver CA with metastasis - primary source liver? Need Biopsy IR consult placed. VOA consulted. NPO   HCV - s/p treatment  IDDM - see above. Hx of gout - not on treatment. Was on colchicine. BPH - not on treatment  Malnutrition - mild    DVT Protocol Active: yes  Code Status:  Full Code     Disposition: home tod/caio    Subjective:     CC: Abdominal Pain    Interval History: no overnight issues. BG still low now on D5+0.5NS. Planned biopsy 1pm awaiting VOA consult. Possible discharge home after biopsy. ROS: 11 point ROS negative     Objective:     Visit Vitals    /85    Pulse (!) 104    Temp 98.3 °F (36.8 °C)    Resp 20    Ht 6' (1.829 m)    Wt 68.2 kg (150 lb 4.8 oz)    SpO2 96%    BMI 20.38 kg/m2       Temp (24hrs), Av °F (36.7 °C), Min:97.7 °F (36.5 °C), Max:98.3 °F (36.8 °C)      No intake or output data in the 24 hours ending 17 1349    Gen: AOx3, NAD  HEENT:  WILD, EOMI. Neck: No Bruits/JVD   Lungs:   CTAB.  Good respiratory effort  Heart:   RR S1 S2 without M/R/G  Abdomen: mild distension,NT, BSX4,   Extremities:   No LE edema. No cyanosis.   Skin:  no jaundice/lesions      Data Review:     Meds/Labs/Tests reviewed    Current Shift:     Last three shifts:     Recent Labs      09/27/17   0500  09/26/17   1110   WBC  9.9  13.9*   RBC  5.05  5.52*   HGB  14.5  16.1*   HCT  44.2  46.7   PLT  165  182   GRANS   --   81*   LYMPH   --   6*   EOS   --   2       Recent Labs      09/27/17   0500  09/26/17   1110   BUN  10  7   CREA  0.90  0.88   CA  8.9  9.4   ALB   --   2.6*   K  4.3  3.7   NA  140  136   CL  105  99*   CO2  23  27   GLU  57*  55*        Lab Results   Component Value Date/Time    Glucose 57 09/27/2017 05:00 AM    Glucose 55 09/26/2017 11:10 AM    Glucose 137 09/03/2017 01:20 PM    Glucose 117 06/03/2016 08:38 AM    Glucose 113 07/11/2015 07:40 PM          Care coordination with Nursing/Consultants/staff: 15  Prior history, labs, and charting reviewed: 15    Procedures/Imaging:  CT abdpelv 9/26  CXR  IR guided biopsy    Total time spent with chart review, patient examination/education, discussion with staff on case,documentation and medication management / adjustment  :  30 Minutes      Dr Agus Sloan  [phone]

## 2017-09-27 NOTE — PROGRESS NOTES
Patient denies any issues with sedation in the past. Lungs clear on auscultation and Heart tones S1,S2 normal. Patient c/o 8 out 10 back pain. Patient positioned on the CT table.

## 2017-09-27 NOTE — ACP (ADVANCE CARE PLANNING)
Patient has designated _____wife___________________ to participate in his/her discharge plan and to receive any needed information.      Name: Luisana Gaytan  [address] [place] [address], [place]  Phone number:[phone]

## 2017-09-27 NOTE — PROGRESS NOTES
TRANSFER - OUT REPORT:    Verbal report given to HARRIET Romero on Manuel Davis  being transferred to [facility] for routine progression of care       Report consisted of patients Situation, Background, Assessment and   Recommendations(SBAR). Information from the following report(s) SBAR, Procedure Summary, MAR and Cardiac Rhythm Sinus tach was reviewed with the receiving nurse. Lines:   Peripheral IV 09/26/17 Left Antecubital (Active)   Site Assessment Clean, dry, & intact 9/27/2017 11:40 AM   Phlebitis Assessment 0 9/27/2017 11:40 AM   Infiltration Assessment 0 9/27/2017 11:40 AM   Dressing Status Clean, dry, & intact 9/27/2017 11:40 AM   Dressing Type Transparent 9/27/2017 11:40 AM   Hub Color/Line Status Pink;Capped 9/27/2017 11:40 AM   Action Taken Open ports on tubing capped 9/27/2017 11:40 AM   Alcohol Cap Used Yes 9/27/2017 11:40 AM        Opportunity for questions and clarification was provided.       Patient transported with:   Tech     Patient under direct supervision by Radiology Nurse, Homa John until transport arrival

## 2017-09-27 NOTE — PROGRESS NOTES
Nutrition initial assessment/Plan of care      RECOMMENDATIONS:   1. NPO. Advance diet when medically indicated  2. Monitor weight and PO intake  3. RD to follow     GOALS:   1. PO intake meets >75% of protein/calorie needs by 9/30  2. Weight Maintenance (+/- 1-2 lb) by 10/4        ASSESSMENT:   Per BMI of 20.4, weight is in the normal classification. 21% wt loss in a few months which is severe. PO intake is poor vs NPO order. Labs noted. Nutrition recommendations listed. RD to follow. Nutrition Diagnoses:   Inadequate oral intake related to cancer as evidenced by 21% weight loss. Meets Criteria for Chronic Malnutrition      [x]Moderate Malnutrition, as evidenced by:   [] Mild muscle wasting, loss of subcutaneous fat   [x] Nutritional intake <75% of recommended intake for >1 month   [x] Weight loss of  5% in 1 month, 7.5% in 3 months, 10% in 6 months, or 20% in 1 year   [] Mild edema          Nutrition Risk:  [x] High  [] Moderate []  Low    SUBJECTIVE/OBJECTIVE:   Patient with metastatic cancer to liver. He has h/o diabetes, Hepatitis C  Poor appetite for 1 week PTA. Pt not in his room at time of visit. Per hcart review, wt was 190 lb on 6/16. Will follow. Information Obtained from:    [x] Chart Review   [] Patient   [] Family/Caregiver   [] Nurse/Physician   [] Interdisciplinary Meeting/Rounds    Diet: NPO  Medications: [x] Reviewed    Allergies: [x] Reviewed   Encounter Diagnoses     ICD-10-CM ICD-9-CM   1.  Hepatic carcinoma (HCC) C22.0 155.0     Past Medical History:   Diagnosis Date    BPH (benign prostatic hypertrophy) with urinary retention     Chronic low back pain     Diabetes (HCC)     DJD (degenerative joint disease)     ED (erectile dysfunction)     Elevated PSA     Glucose intolerance (impaired glucose tolerance)     Gout     Hepatitis C     Herniated disc     History of total knee replacement 3/2006    Hyperlipidemia     Incomplete emptying of bladder     Lupus ([address]     Osteoarthritis     Sleep apnea       Labs:    Lab Results   Component Value Date/Time    Sodium 140 09/27/2017 05:00 AM    Potassium 4.3 09/27/2017 05:00 AM    Chloride 105 09/27/2017 05:00 AM    CO2 23 09/27/2017 05:00 AM    Anion gap 12 09/27/2017 05:00 AM    Glucose 57 09/27/2017 05:00 AM    BUN 10 09/27/2017 05:00 AM    Creatinine 0.90 09/27/2017 05:00 AM    Calcium 8.9 09/27/2017 05:00 AM    Albumin 2.6 09/26/2017 11:10 AM     Anthropometrics: BMI (calculated): 20.4  Last 3 Recorded Weights in this Encounter    09/26/17 1556 09/26/17 2057   Weight: 68.5 kg (151 lb) 68.2 kg (150 lb 4.8 oz)      Ht Readings from Last 1 Encounters:   09/26/17 6' (1.829 m)       IBW: 178 lb %IBW: 84%    Estimated Nutrition Needs: [x] MSJ  [] Other:  Calories: 2700 kcal Based on:   [x] Actual BW    Protein:   75-95 g Based on:   [x] Actual BW    Fluid:       3864-0171 ml Based on:   [x] Actual BW      [x] No Cultural, Methodist or ethnic dietary need identified.     [] Cultural, Methodist and ethnic food preferences identified and addressed     Wt Status:  [x] Normal (18.6 - 24.9) [] Underweight (<18.5) [] Overweight (25 - 29.9) [] Mild Obesity (30 - 34.9)  [] Moderate Obesity (35 - 39.9) [] Morbid Obesity (40+)   [x] Moderate Malnutrition [] Severe Malnutrition in the context of :     Nutrition Problems Identified:   [x] Suboptimal PO intake   [] Food Allergies  [] Difficulty chewing/swallowing/poor dentition  [] Constipation/Diarrhea   [] Nausea/Vomiting   [] None  [] Other:     Plan:   [] Therapeutic Diet  []  Obtained/adjusted food preferences/tolerances and/or snacks options   []  Supplements added   [] Occupational therapy following for feeding techniques  []  HS snack added   []  Modify diet texture   []  Modify diet for food allergies   []  Educate patient   []  Assist with menu selection   [x]  Monitor PO intake on meal rounds   [x]  Continue inpatient monitoring and intervention   []  Participated in discharge planning/Interdisciplinary rounds/Team meetings   []  Other:     Education Needs:   [x] Not appropriate for teaching at this time   [] Identified and addressed    Nutrition Monitoring and Evaluation:  [x] Continue ongoing monitoring and intervention  [] Other    Ernie Garsia, [address] [place] [address]  Pager: [phone]

## 2017-09-27 NOTE — PROGRESS NOTES
VASCULAR & INTERVENTIONAL RADIOLOGY PROGRESS NOTE    H&P reviewed, pt ready for procedure.     Brittny Nathan MD  Vascular & Interventional Radiology  [facility]  9/27/2017

## 2017-09-27 NOTE — PROGRESS NOTES
At 0727 this morning this patients blood sugar was 68. Brigid LARIOS was notified of the reading at that time. I gave him apple juice.

## 2017-09-27 NOTE — MANAGEMENT PLAN
[facility]   Discharge Planning/ Assessment    Reasons for Intervention:   Interviewed patient. Verified demographics listed on face sheet with patient. Pt has Medicare A&B and [facility] Medicaid. Patient stated their PCP is Dr America Hogan, not Dr Enzo Bourne listed and last appt 1 week ago. Patient lives alone in single family home . Patient's NOK is wife, who does not live with him: Clement Streeter at [phone] and she can have any info and make decisions if he cannot. Patient independent with ADLs prior to admission. States tires easily and starting to be more unsteady on feet. DME prior to admission: cane; states needs walker Discharge plan is Home with Home Health.  Asked pt about Advanced Directive and he states he has been thinking about it, but has not done yet    Hussain Yusuf RN BSN  Outcomes Manager  Pager # [phone]    High Risk Criteria  [x] Yes  []No   Physician Referral  [] Yes  [x]No        Date    Nursing Referral  [] Yes  [x]No        Date    Patient/Family Request  [] Yes  [x]No        Date       Resources:    Medicare  [x] Yes  []No   Medicaid  [x] Yes  []No   No Resources  [] Yes  [x]No   Private Insurance  [] Yes  [x]No    Name/Phone Number    Other  [] Yes  [x]No        (i.e. Workman's Comp)         Prior Services:    Prior Services  [] Yes  [x]No   Home Health  [] Yes  [x]No   [address] [place]  [] Yes  [x]No        Number of [address]  [] Yes  [x]No       Meals on Wheels  [] Yes  [x]No   Office on Aging  [] Yes  [x]No   Transportation Services  [] Yes  [x]No   Nursing Home  [] Yes  [x]No        Nursing Home Name    [address]  [] Yes  [x]No        [address] Name    Other       Information Source:      Information obtained from  [x] Patient  [] Parent   [] [address]  [] Child  [] Spouse   [] Significant Other/Partner   [] Friend      [] EMS    [] Nursing Home Chart          [] Other:   Chart Review  [x] Yes  []No Family/Support System:    Patient lives with  [x] Alone    [] Spouse   [] Significant Other  [] Children  [] Caretaker   [] Parent  [] Sibling     [] Other       Other Support System:    Is the patient responsible for care of others  [] Yes  [x]No   Information of person caring for patient on  discharge    Managers financial affairs independently  [x] Yes  []No   If no, explain:      Status Prior to Admission:    Mental Status  [x] Awake  [x] Alert  [x] Oriented  [] Quiet/Calm [] Lethargic/Sedated   [] Disoriented  [] Restless/Anxious  [] Combative   Personal Care  [] Dependent  [x] [address]  [] Requires Assistance   Meal Preparation Ability  [x] Independent   [] Standby Assistance   [] Minimal Assistance   [] Moderate Assistance  [] Maximum Assistance     [] Total Assistance   Chores  [x] Independent with Chores   [] [address] Resident   [] Requires Assistance   Bowel/Bladder  [x] Continent  [] Catheter  [] Incontinent  [] Ostomy Self-Care    [] Urine Diversion Self-Care  [] Maximum Assistance     [] Total Assistance   Number of Persons needed for assistance    DME at home  [] [address], [place] Marn  [x] [address] [facility], [address], Straight   [] Commode    [] Bathroom/Grab Bars  [] Hospital Bed  [] Nebulizer  [] Oxygen           [] Raised Toilet Seat  [] Shower Chair  [] Side Rails for Bed   [] Tub Transfer Bench   [] José Luis Room  [] [facility], Standard      [] Other:   Vendor      Treatment Presently Receiving:    Current Treatments  [] Chemotherapy  [] Dialysis  [] Insulin  [] IVAB [x] IVF   [] O2  [] PCA   [] PT   [] RT   [] Tube Feedings   [] Wound Care     Psychosocial Evaluation:    Verbalized Knowledge of Disease Process  [] Patient  []Family   Coping with Disease Process  [] Patient  []Family   Requires Further Counseling Coping with Disease Process  [] Patient  []Family     Identified Projected Needs:    Home Health Aid  [] Yes  [x]No   Transportation  [] Yes  [x]No   Education  [] Yes  [x]No        Specific Education     Financial Counseling  [] Yes  [x]No   Inability to Care for Self/Will Require 24 hour care  [] Yes  [x]No   Pain Management  [] Yes  [x]No   Home Infusion Therapy  [] Yes  [x]No   Oxygen Therapy  [] Yes  [x]No   DME  [] Yes  [x]No   Long Term Care Placement  [] Yes  [x]No   Rehab  [] Yes  [x]No   Physical Therapy  [x] Yes  []No   Needs Anticipated At This Time  [x] Yes  []No     Intra-Hospital Referral:    [address] [facility]  [] Yes  [x]No     [] Yes  [x]No   Patient Representative  [] Yes  [x]No   Staff for Teaching Needs  [] Yes  [x]No   Specialty Teaching Needs     Diabetic Educator  [] Yes  [x]No   Referral for Diabetic Educator Needed  [] Yes  [x]No  If Yes, place order for Nutritionist or Diabetic Consult     Tentative Discharge Plan:    Home with No Services  [] Yes  [x]No   Home with Home Health Follow-up  [] Yes  [x]No        If Yes, specify type    Home Care Program  [x] Yes  []No        If Yes, specify type    Meals on Wheels  [] Yes  [x]No   Office of Aging  [] Yes  [x]No   NHP  [] Yes  [x]No   Return to the Nursing Home  [] Yes  [x]No   Rehab Therapy  [] Yes  [x]No   Acute Rehab  [] Yes  [x]No   Subacute Rehab  [] Yes  [x]No   Private Care  [] Yes  [x]No   Substance Abuse Referral  [] Yes  [x]No   Transportation  [] Yes  [x]No   Chore Service  [] Yes  [x]No   Inpatient Hospice  [] Yes  [x]No   OP RT  [] Yes  [x] No   OP Hemo  [] Yes  [x] No   OP PT  [] Yes  [x]No   Support Group  [] Yes  [x]No   Reach to Recovery  [] Yes  [x]No   OP Oncology Clinic  [] Yes  [x]No   Clinic Appointment  [] Yes  [x]No   DME  [] Yes  [x]No   Comments    Name of D/C Planner or  Given to Patient or Family Nu Petres RN BSN  Outcomes Manager  Pager # [phone]   Phone Number         Extension    Date 9/27/2017   Time [phone]   If you are discharged home, whom do you designate to participate in your discharge plan and receive any information needed?      Enter name of designee wife Phone # of designee         Address of designee         Updated         Patient refused to designate any           individual

## 2017-09-27 NOTE — PROGRESS NOTES
VASCULAR & INTERVENTIONAL RADIOLOGY PROGRESS NOTE    CT guided percutaneous liver bx performed with mod sedation w/o complication    Patel Spring MD  Vascular & Interventional Radiology  [facility]  9/27/2017

## 2017-09-28 VITALS
WEIGHT: 150.3 LBS | BODY MASS INDEX: 20.36 KG/M2 | HEIGHT: 72 IN | TEMPERATURE: 97.5 F | RESPIRATION RATE: 18 BRPM | HEART RATE: 86 BPM | DIASTOLIC BLOOD PRESSURE: 78 MMHG | SYSTOLIC BLOOD PRESSURE: 124 MMHG | OXYGEN SATURATION: 98 %

## 2017-09-28 LAB
ANION GAP SERPL CALC-SCNC: 8 MMOL/L (ref 3–18)
BUN SERPL-MCNC: 9 MG/DL (ref 7–18)
BUN/CREAT SERPL: 12 (ref 12–20)
CALCIUM SERPL-MCNC: 8.6 MG/DL (ref 8.5–10.1)
CHLORIDE SERPL-SCNC: 104 MMOL/L (ref 100–108)
CO2 SERPL-SCNC: 24 MMOL/L (ref 21–32)
CREAT SERPL-MCNC: 0.77 MG/DL (ref 0.6–1.3)
ERYTHROCYTE [DISTWIDTH] IN BLOOD BY AUTOMATED COUNT: 16.2 % (ref 11.6–14.5)
GLUCOSE BLD STRIP.AUTO-MCNC: 118 MG/DL (ref 70–110)
GLUCOSE BLD STRIP.AUTO-MCNC: 133 MG/DL (ref 70–110)
GLUCOSE BLD STRIP.AUTO-MCNC: 177 MG/DL (ref 70–110)
GLUCOSE BLD STRIP.AUTO-MCNC: 38 MG/DL (ref 70–110)
GLUCOSE BLD STRIP.AUTO-MCNC: 44 MG/DL (ref 70–110)
GLUCOSE BLD STRIP.AUTO-MCNC: 49 MG/DL (ref 70–110)
GLUCOSE BLD STRIP.AUTO-MCNC: 75 MG/DL (ref 70–110)
GLUCOSE SERPL-MCNC: 41 MG/DL (ref 74–99)
HCT VFR BLD AUTO: 41.6 % (ref 36–48)
HGB BLD-MCNC: 13.6 G/DL (ref 13–16)
MCH RBC QN AUTO: 28.9 PG (ref 24–34)
MCHC RBC AUTO-ENTMCNC: 32.7 G/DL (ref 31–37)
MCV RBC AUTO: 88.5 FL (ref 74–97)
PLATELET # BLD AUTO: 151 K/UL (ref 135–420)
PMV BLD AUTO: 9.6 FL (ref 9.2–11.8)
POTASSIUM SERPL-SCNC: 3.6 MMOL/L (ref 3.5–5.5)
RBC # BLD AUTO: 4.7 M/UL (ref 4.7–5.5)
SODIUM SERPL-SCNC: 136 MMOL/L (ref 136–145)
WBC # BLD AUTO: 11.5 K/UL (ref 4.6–13.2)

## 2017-09-28 PROCEDURE — 36415 COLL VENOUS BLD VENIPUNCTURE: CPT | Performed by: INTERNAL MEDICINE

## 2017-09-28 PROCEDURE — 85027 COMPLETE CBC AUTOMATED: CPT | Performed by: INTERNAL MEDICINE

## 2017-09-28 PROCEDURE — 74011250637 HC RX REV CODE- 250/637: Performed by: INTERNAL MEDICINE

## 2017-09-28 PROCEDURE — 80048 BASIC METABOLIC PNL TOTAL CA: CPT | Performed by: INTERNAL MEDICINE

## 2017-09-28 PROCEDURE — 74011250636 HC RX REV CODE- 250/636: Performed by: INTERNAL MEDICINE

## 2017-09-28 PROCEDURE — 82962 GLUCOSE BLOOD TEST: CPT

## 2017-09-28 PROCEDURE — 74011000258 HC RX REV CODE- 258: Performed by: INTERNAL MEDICINE

## 2017-09-28 PROCEDURE — 74011636637 HC RX REV CODE- 636/637: Performed by: INTERNAL MEDICINE

## 2017-09-28 PROCEDURE — 77030020253 HC SOL INJ D545NS .05 DEX .45 SAL

## 2017-09-28 RX ORDER — OXYCODONE HYDROCHLORIDE 5 MG/1
5-10 TABLET ORAL
Qty: 45 TAB | Refills: 0 | Status: SHIPPED | OUTPATIENT
Start: 2017-09-28

## 2017-09-28 RX ORDER — INSULIN LISPRO 100 [IU]/ML
10 INJECTION, SOLUTION INTRAVENOUS; SUBCUTANEOUS
COMMUNITY
End: 2017-09-28

## 2017-09-28 RX ORDER — INSULIN LISPRO 100 [IU]/ML
10 INJECTION, SOLUTION INTRAVENOUS; SUBCUTANEOUS
Qty: 1 VIAL | Refills: 3 | Status: SHIPPED
Start: 2017-09-28

## 2017-09-28 RX ORDER — ONDANSETRON 8 MG/1
8 TABLET, ORALLY DISINTEGRATING ORAL
Qty: 30 TAB | Refills: 1 | Status: SHIPPED | OUTPATIENT
Start: 2017-09-28

## 2017-09-28 RX ADMIN — Medication 16 G: at 08:47

## 2017-09-28 RX ADMIN — MORPHINE SULFATE 5 MG: 10 INJECTION INTRAMUSCULAR; INTRAVENOUS; SUBCUTANEOUS at 05:52

## 2017-09-28 RX ADMIN — INSULIN LISPRO 2 UNITS: 100 INJECTION, SOLUTION INTRAVENOUS; SUBCUTANEOUS at 11:16

## 2017-09-28 RX ADMIN — HEPARIN SODIUM 5000 UNITS: 5000 INJECTION, SOLUTION INTRAVENOUS; SUBCUTANEOUS at 05:51

## 2017-09-28 RX ADMIN — DEXTROSE MONOHYDRATE AND SODIUM CHLORIDE 75 ML/HR: 5; .45 INJECTION, SOLUTION INTRAVENOUS at 06:16

## 2017-09-28 RX ADMIN — MORPHINE SULFATE 5 MG: 10 INJECTION INTRAMUSCULAR; INTRAVENOUS; SUBCUTANEOUS at 00:07

## 2017-09-28 RX ADMIN — MORPHINE SULFATE 5 MG: 10 INJECTION INTRAMUSCULAR; INTRAVENOUS; SUBCUTANEOUS at 11:14

## 2017-09-28 NOTE — PROGRESS NOTES
1940: Bedside shift change report given to Omar Gan RN (oncoming nurse) by Kirk Bah (offgoing nurse). Report included the following information SBAR, Kardex, Procedure Summary, Intake/Output and MAR.     0030: Pt. Sleeping soundly, no concerns at this time. 0300: Pt. Resting quietly, no concerns at this time. Call bell within reach. 0730: Bedside shift change report given to  (oncoming nurse) by *** (offgoing nurse). Report included the following information {SBAR REPORTS [id number]}.

## 2017-09-28 NOTE — CDMP QUERY
(H&P with mild malnutrition dx)  Please clarify if you concur with RD assessment of:    =>Moderate Malnutrition, RD following  =>Other Explanation of clinical findings  =>Unable to Determine (no explanation of clinical findings)    The medical record reflects the following:    Risk:  Liver CA with mets    Clinical Indicators:  9/27 RD assessment:  Moderate Malnutrition, as evidenced by:                         Nutritional intake <75% of recommended intake for >1 month                         Weight loss of  5% in 1 month, 7.5% in 3 months, 10% in 6 months, or 20% in 1 year  Per BMI of 20.4, weight is in the normal classification. 21% wt loss in a few months which is severe  Inadequate oral intake related to cancer as evidenced by 21% weight loss    Treatment: RD plan:  2. Monitor weight and PO intake 3. RD to follow     Please clarify and document your clinical opinion in the progress notes and discharge summary including the definitive and/or presumptive diagnosis, (suspected or probable), related to the above clinical findings. Please include clinical findings supporting your diagnosis. If you DECLINE this query or would like to communicate with [facility], please utilize the \"[facility] message box\" at the TOP of the Progress Note on the right.       Thank you,  Roxana Cardenas RN BSN CCDS [phone]

## 2017-09-28 NOTE — DISCHARGE SUMMARY
2 [facility]  Hospitalist Division    Discharge Summary      Patient: Avinash Kitchen MRN: [id number]  CSN: [id number]    YOB: 1960  Age: 59 y.o. Sex: male    DOA: 9/26/2017 LOS:  LOS: 2 days   Discharge Date: 09/30/17     PCP:  Laz Shelley MD    Chief Complaint:    Chief Complaint   Patient presents with    Abdominal Pain     Liver cancer, primary, with metastasis from liver to other site [facility])    Admission Diagnosis:   Hospital Problems as of 9/28/2017  Date Reviewed: 10/27/2014          Codes Class Noted - Resolved POA    [facility] FIX-40-CF: E11.8  ICD-9-CM: 250.90  9/27/2017 - Present Unknown        Hypoglycemia due to insulin ICD-10-CM: E16.0, T38.3X5A  ICD-9-CM: 251.1  9/27/2017 - Present Unknown        * (Principal)Liver cancer, primary, with metastasis from liver to other site [facility]) ICD-10-CM: C22.8  ICD-9-CM: 155.0  9/26/2017 - Present Unknown        Hepatitis C ICD-10-CM: B19.20  ICD-9-CM: 070.70  5/15/2012 - Present Yes        Gout ICD-10-CM: M10.9  ICD-9-CM: 274.9  5/15/2012 - Present Yes              Discharge Diagnoses:    Hypoglycemia - hold levemir till tonight. Keep eating/drink juice. SSI AC/HS  Liver CA with metastasis - primary source suspected to be liver. Biopsy resultes pending  HCV - s/p treatment  IDDM - see above. Hx of gout - not on treatment. Was on colchicine. BPH - not on treatment  Malnutrition - mild  Moderate Malnutrition, RD following    Hospital Course:   59 y.o. male with PMHX of IDDM, HCV, Gout and recent liver CA who presents with 1 week of sweating, weakness, and back pain. States he has had anorexia since a procedure in July, possible a liver biopsy. He denies any other N/V/D or bowel symptoms. He follows with Dr Aurea Garrison for liver cancer and states he had chemotherapy in July, no records in epic. He has HCV but this was treated prior to chemotherapy.   CT abd reveals increased peritoneal fluid, multiple liver masses, and retroperitoneal and pelvic adenopathy. Dr Aleisha Haile contacted. Despite code discussion patient still wants to be full code. He was made NPO and underwent a liver mass biopsy on 9/27 for further prognosis and treatment plans. Dr Nellie Fisher was contacted and thought the patient had went home, as he did go home briefly from ER but came back. He will see [facility]/Dr Nellie Fisher Oct 6th 1:40pm for biopsy result follow-up and future plans. He was held overnight as he continued to be hypoglycemic. His insulin doses were greatly reduced from Levemir 60u BID and 60u premeal humlaog to 4u levemir qHS and 10u humalog pre-meal. He can titrate from these doses.     Significant Diagnostic Studies:  CT abd 9/26  CT guided percutaneous liver biopsy with pathology present    Consults:  Treatment Team: Consulting Provider: Maria Tony DO; Utilization Review: Eileen Canela RN; Care Manager: Lorenza Lozano RN    Operative Procedures:  CT guided percutaneous liver biopsy with pathology present    Disposition:  Home    Diet:  Regular high calorie    Discharge Condition:   Good    Discharge Medications:    Discharge Medication List as of 9/28/2017  2:37 PM      START taking these medications    Details   oxyCODONE IR (ROXICODONE) 5 mg immediate release tablet Take 1-2 Tabs by mouth every six (6) hours as needed for Pain. Max Daily Amount: 40 mg., Print, Disp-45 Tab, R-0      ondansetron (ZOFRAN ODT) 8 mg disintegrating tablet Take 1 Tab by mouth every eight (8) hours as needed for Nausea. , Print, Disp-30 Tab, R-1         CONTINUE these medications which have CHANGED    Details   insulin detemir (LEVEMIR FLEXPEN) 100 unit/mL (3 mL) inpn 4 Units by SubCUTAneous route nightly., No Print, Disp-1 Adjustable Dose Pre-filled Pen Syringe, R-1      insulin lispro (HUMALOG) 100 unit/mL injection 10 Units by SubCUTAneous route Before breakfast, lunch, and dinner., No Print, Disp-1 Vial, R-3 insulin detemir (LEVEMIR) 100 unit/mL injection 20 Units by SubCUTAneous route nightly., Normal, Disp-1 Vial, R-1         CONTINUE these medications which have NOT CHANGED    Details   ergocalciferol (VITAMIN D2) 50,000 unit capsule Take 50,000 Units by mouth., Historical Med         STOP taking these medications       metFORMIN (GLUCOPHAGE) 500 mg tablet Comments:   Reason for Stopping:               No results found for this or any previous visit (from the past 24 hour(s)). Follow-Up And Discharge Instructions:    Follow-up Information     Follow up With Details Comments 71 Lauren Anand MD  Oct 6 1:40pm appointment [email or web address]  [facility] [place] [address]      Blair Estrada MD On 10/4/2017 830am [facility] [employer] [place] [phone]              Wound Care/Supplies:   N/A      [facility]  Hospitalist Division        Time Spent:  35min    Cc: Blair Estrada MD

## 2017-09-28 NOTE — DIABETES MGMT
Diabetes Patient/Family Education Record    Factors That  May Influence Patients Ability  to Learn or  Comply With  Recommendations:    []   Language barrier    []   Cultural needs   []   Motivation    []   Cognitive limitation    []   Physical   []   Education    []   Physiological factors   []   Hearing/vision/speaking impairment   []   Congregation beliefs    []   Financial factors   []  Other:   [x]  No factors identified at this time. Person Instructed:   [x]   Patient   []   Family   []  Other     Preference for Learning:   [x]   Verbal   []   Written   []  Demonstration     Level of Comprehension & Competence:    [x]  Good                                      [] Fair                                     []  Poor                             []  Needs Reinforcement   [x]  Teachback completed    Education Component:   [x]  Medication management, including how to administer insulin (if appropriate) and potential medication interactions    []  Nutritional management including the role of carbohydrate intake   []  Exercise   [x]  Signs, symptoms, and treatment of hyperglycemia and hypoglycemia   [x] Treatment of hyperglycemia and hypoglycemia   [x]  Importance of blood glucose monitoring and how to obtain a blood glucose meter states he needs a new meter. Will provide a Freestyle glucometer.    [x]  Instruction on use of blood glucose meter   [x]  Discuss the importance of HbA1C monitoring and provide patient with  results   [x]  Sick day guidelines   [x]  Proper use and disposal of lancets, needles, syringes or insulin pens (if appropriate)   [x]  Potential long-term complications (retinopathy, kidney disease, neuropathy, heart disease, stroke, vascular disease, foot care)   [] Provide emergency contact number and contact number for more information    [x]  Goal:  Patient/family will demonstrate understanding of Diabetes Self Management Skills by: (date) ___10/03/17____  Plan for post-discharge education or self-management support:    [] Outpatient class schedule provided            [] Patient Declined    [] Scheduled for outpatient classes (date) _______

## 2017-09-28 NOTE — PROGRESS NOTES
1367 -- Bedside, Verbal and Written shift change report given to 230Lorenza Vicente  (oncoming nurse) by Mary Alice Person (offgoing nurse). Report included the following information SBAR, Kardex, Intake/Output, MAR and Recent Results. 8254 -- Critical Lab result Blood Glucose 41, CNA nicole reassess.       0818 -- Reassess BG 44, patient given two cranberry juices, will reassess. 0830 -- Reassessed after juice patient BG 38, given 16g of Glucose, will reasses. 7099 -- BG retaken, BG 75, will continue to monitor. 3788 -- BG rechecked, , will continue to monitor. 1108 -- BG rechecked, , 2 units Lispro given, will continue to monitor. [phone] - Patient received Influenza vaccine prior to coming to hospital from primary care physician. 1109-- Shift reassessment completed, no changes, will continue to monitor.      1408-- Shift reassessment completed, no changes, will continue to monitor.      1500 -- patient is discharged.  Micah Colin

## 2017-09-28 NOTE — PROGRESS NOTES
1339 -- Bedside, Verbal and Written shift change report given to 2309 Jules Nails (oncoming nurse) by Romulo Briseno (offgoing nurse). Report included the following information SBAR, Kardex, Intake/Output, MAR and Recent Results.      1035 -- Contacted MD Jer Ewing [phone], spoke with assist Naz Meier, advised MD Lomeli has been in contact with MD Whitt.

## 2017-09-28 NOTE — PROGRESS NOTES
conducted an initial consultation and Spiritual Assessment for Lennox Nancy, who is a 59 y.o.,male. Patients Primary Language is: [place]. According to the patients EMR Church Affiliation is: Hindu.     The reason the Patient came to the hospital is:   Patient Active Problem List    Diagnosis Date Noted    Type II diabetes mellitus with complication ([facility] 74/43/0802    Hypoglycemia due to insulin 09/27/2017    Liver cancer, primary, with metastasis from liver to other site [facility]) 09/26/2017    Hyperglycemia 05/17/2015    Hypertrophy of prostate with urinary obstruction and other lower urinary tract symptoms (LUTS) 10/08/2012    Microhematuria 07/06/2012    Urinary retention 07/06/2012    Elevated PSA 05/17/2012    Chest pain 05/15/2012    Suicidal thoughts 05/15/2012    Homicidal ideations 05/15/2012    Shoulder pain, left 05/15/2012    History of knee replacement, total 05/15/2012    Hepatitis C 05/15/2012    Hyperlipidemia 05/15/2012    Gout 05/15/2012    Sleep apnea 05/15/2012        The  provided the following Interventions:  Initiated a relationship of care and support. Explored issues of joaquin, belief, spirituality and Methodist/ritual needs while hospitalized. Listened empathically. Provided information about Spiritual Care Services. Offered prayer and assurance of continued prayers on patient's behalf. Chart reviewed. The following outcomes were achieved:  Patient shared limited information about both their medical narrative and spiritual journey/beliefs. Patient processed feeling about current hospitalization. Patient expressed gratitude for 's visit. Assessment:  Patient does not have any Methodist/cultural needs that will affect patients preferences in health care. There are no further spiritual or Methodist issues which require intervention at this time.      Plan:  Chaplains will continue to follow and will provide pastoral care on an as needed/requested basis.  recommends bedside caregivers page  on duty if patient shows signs of acute spiritual or emotional distress. TRUMAN HerreraDiv.   [facility] [address]  [phone]

## 2017-09-28 NOTE — DISCHARGE INSTRUCTIONS
DISCHARGE SUMMARY from Nurse    The following personal items are in your possession at time of discharge:    Dental Appliances: None  Visual Aid: None, Glasses, With patient     Home Medications: None  Jewelry: None  Clothing: Socks, Undergarments, Pants, Shirt, Footwear  Other Valuables: Cell Phone, Eyeglasses, [place] [employer], [id number], Personal electronic devices (comment)             PATIENT INSTRUCTIONS:    After general anesthesia or intravenous sedation, for 24 hours or while taking prescription Narcotics:  · Limit your activities  · Do not drive and operate hazardous machinery  · Do not make important personal or business decisions  · Do  not drink alcoholic beverages  · If you have not urinated within 8 hours after discharge, please contact your surgeon on call. Report the following to your surgeon:  · Excessive pain, swelling, redness or odor of or around the surgical area  · Temperature over 100.5  · Nausea and vomiting lasting longer than 4 hours or if unable to take medications  · Any signs of decreased circulation or nerve impairment to extremity: change in color, persistent  numbness, tingling, coldness or increase pain  · Any questions        What to do at Home:  Recommended activity: Activity as tolerated. If you experience any of the following symptoms chest pain, dizziness and/or swelling of the abdomen, please follow up with Primary care provider or ED. *  Please give a list of your current medications to your Primary Care Provider. *  Please update this list whenever your medications are discontinued, doses are      changed, or new medications (including over-the-counter products) are added. *  Please carry medication information at all times in case of emergency situations.           These are general instructions for a healthy lifestyle:    No smoking/ No tobacco products/ Avoid exposure to second hand smoke    Surgeon General's Warning:  Quitting smoking now greatly reduces serious risk to your health. Obesity, smoking, and sedentary lifestyle greatly increases your risk for illness    A healthy diet, regular physical exercise & weight monitoring are important for maintaining a healthy lifestyle    You may be retaining fluid if you have a history of heart failure or if you experience any of the following symptoms:  Weight gain of 3 pounds or more overnight or 5 pounds in a week, increased swelling in our hands or feet or shortness of breath while lying flat in bed. Please call your doctor as soon as you notice any of these symptoms; do not wait until your next office visit. Recognize signs and symptoms of STROKE:    F-face looks uneven    A-arms unable to move or move unevenly    S-speech slurred or non-existent    T-time-call 911 as soon as signs and symptoms begin-DO NOT go       Back to bed or wait to see if you get better-TIME IS BRAIN. Warning Signs of HEART ATTACK     Call 911 if you have these symptoms:   Chest discomfort. Most heart attacks involve discomfort in the center of the chest that lasts more than a few minutes, or that goes away and comes back. It can feel like uncomfortable pressure, squeezing, fullness, or pain.  Discomfort in other areas of the upper body. Symptoms can include pain or discomfort in one or both arms, the back, neck, jaw, or stomach.  Shortness of breath with or without chest discomfort.  Other signs may include breaking out in a cold sweat, nausea, or lightheadedness. Don't wait more than five minutes to call 911 - MINUTES MATTER! Fast action can save your life. Calling 911 is almost always the fastest way to get lifesaving treatment. Emergency Medical Services staff can begin treatment when they arrive -- up to an hour sooner than if someone gets to the hospital by car. The discharge information has been reviewed with the patient. The patient verbalized understanding.     Discharge medications reviewed with the patient and appropriate educational materials and side effects teaching were provided. MyChart Activation    Thank you for enrolling in [address] Please follow the instructions below to securely access your online medical record. [facility] allows you to send messages to your doctor, view your test results, renew your prescriptions, schedule appointments, and more. How Do I Sign Up? 1. In your internet browser, go to [email or web address]. 2. Click on the First Time User? Click Here link in the Sign In box. You will see the New Member Sign Up page. 3. Enter your [facility] Access Code exactly as it appears below. You will not need to use this code after youve completed the sign-up process. If you do not sign up before the expiration date, you must request a new code. [facility] Access Code: [email or web address]  Expires: 12/25/2017  2:21 PM     4. Enter the last four digits of your Social Security Number (xxxx) and Date of Birth (mm/dd/yyyy) as indicated and click Submit. You will be taken to the next sign-up page. 5. Create a [facility] ID. This will be your [facility] login ID and cannot be changed, so think of one that is secure and easy to remember. 6. Create a [facility] password. You can change your password at any time. 7. Enter your Password Reset Question and Answer. This can be used at a later time if you forget your password. 8. Enter your e-mail address. You will receive e-mail notification when new information is available in [address] 9. Click Sign Up. You can now view your medical record. Additional Information    Remember, [facility] is NOT to be used for urgent needs. For medical emergencies, dial 911. Now available from your iPhone and Android!

## 2017-09-28 NOTE — DIABETES MGMT
GLYCEMIC CONTROL AND NUTRITION    Assessment/Recommendations:  Fasting lab glucose this am 41 mg/dl requiring treatment  Recommend discontinuing Levemir or decreasing dose to 5 units at bedtime. Patient states he does not normally have hypoglycemia. This is the first time. Continue corrective insulin coverage as needed  Will continue inpatient monitoring. Most recent blood glucose values:    Current A1C of 6.7 % is equivalent to average blood glucose of 146 mg/dl over the past 2-3 months. Current hospital diabetes medications:   Levemir 20 units every bedtime  Lispro corrective insulin coverage AC&HS. Previous day's insulin requirements:   No corrective insulin coverage needed yesterday  Home diabetes medications:  Per patient  Levemir 40 units every night  Humalog 10 units with meals. 15 units with his largest meal  Diet:    Regular diet with nutritional supplements at all meals. C/o of poor appetite at times.   Education:  __x_Refer to Diabetes Education Record             ____Education not indicated at this time      Indra Valladares RN

## 2017-09-28 NOTE — PROGRESS NOTES
Problem: Falls - Risk of  Goal: *Absence of Falls  Document Thania Fall Risk and appropriate interventions in the flowsheet.    Outcome: Progressing Towards Goal  Fall Risk Interventions:  Mobility Interventions: Utilize walker, cane, or other assitive device

## 2017-09-28 NOTE — CDMP QUERY
Please clarify sites of metastasis:     The medical record reflects the following:  H&P:  \"Liver CA with metastasis\"  \"CT abd reveals increased peritoneal fluid, multiple liver masses, and retroperitoneal and pelvic adenopathy\"  9/26 CT abd \"1. Gross, necrotic periportal and retroperitoneal adenopathy encasing the  visceral branch vessels and anterior aorta. Multiple liver masses. Peroneal  carcinomatosis. Left pelvic adenopathy. 2. Subcentimeter left lower lobe subpleural nodule, nonspecific and for which  metastatic disease is not excluded. 3. Nonobstructive nephrolithiasis\"    Please clarify and document your clinical opinion in the progress notes and discharge summary including the definitive and/or presumptive diagnosis, (suspected or probable), related to the above clinical findings. Please include clinical findings supporting your diagnosis. If you DECLINE this query or would like to communicate with [facility], please utilize the \"[employer] message box\" at the TOP of the Progress Note on the right.       Thank you,  Gertrudis Norman RN BSN CCDS [phone]